# Patient Record
Sex: FEMALE | Race: WHITE | NOT HISPANIC OR LATINO | Employment: FULL TIME | URBAN - METROPOLITAN AREA
[De-identification: names, ages, dates, MRNs, and addresses within clinical notes are randomized per-mention and may not be internally consistent; named-entity substitution may affect disease eponyms.]

---

## 2019-12-01 ENCOUNTER — APPOINTMENT (OUTPATIENT)
Dept: CT IMAGING | Facility: CLINIC | Age: 59
End: 2019-12-01
Attending: EMERGENCY MEDICINE
Payer: COMMERCIAL

## 2019-12-01 ENCOUNTER — HOSPITAL ENCOUNTER (EMERGENCY)
Facility: CLINIC | Age: 59
Discharge: HOME | End: 2019-12-02
Attending: EMERGENCY MEDICINE | Admitting: EMERGENCY MEDICINE
Payer: COMMERCIAL

## 2019-12-01 DIAGNOSIS — T14.8XXA DRAINAGE FROM WOUND: ICD-10-CM

## 2019-12-01 DIAGNOSIS — T81.42XA INFECTION OF DEEP INCISIONAL SURGICAL SITE AFTER PROCEDURE, INITIAL ENCOUNTER: Primary | ICD-10-CM

## 2019-12-01 LAB
ANION GAP SERPL CALCULATED.3IONS-SCNC: 6 MMOL/L (ref 3–14)
BASOPHILS # BLD AUTO: 0.1 10E9/L (ref 0–0.2)
BASOPHILS NFR BLD AUTO: 0.5 %
BUN SERPL-MCNC: 13 MG/DL (ref 7–30)
CALCIUM SERPL-MCNC: 9.4 MG/DL (ref 8.5–10.1)
CHLORIDE SERPL-SCNC: 101 MMOL/L (ref 94–109)
CO2 BLDCOV-SCNC: 27 MMOL/L (ref 21–28)
CO2 SERPL-SCNC: 25 MMOL/L (ref 20–32)
CREAT SERPL-MCNC: 0.65 MG/DL (ref 0.52–1.04)
DIFFERENTIAL METHOD BLD: ABNORMAL
EOSINOPHIL # BLD AUTO: 0.1 10E9/L (ref 0–0.7)
EOSINOPHIL NFR BLD AUTO: 1.1 %
ERYTHROCYTE [DISTWIDTH] IN BLOOD BY AUTOMATED COUNT: 12.1 % (ref 10–15)
GFR SERPL CREATININE-BSD FRML MDRD: >90 ML/MIN/{1.73_M2}
GLUCOSE SERPL-MCNC: 123 MG/DL (ref 70–99)
HCT VFR BLD AUTO: 33.7 % (ref 35–47)
HGB BLD-MCNC: 11.2 G/DL (ref 11.7–15.7)
IMM GRANULOCYTES # BLD: 0.1 10E9/L (ref 0–0.4)
IMM GRANULOCYTES NFR BLD: 0.4 %
LACTATE BLD-SCNC: 0.7 MMOL/L (ref 0.7–2.1)
LYMPHOCYTES # BLD AUTO: 2.4 10E9/L (ref 0.8–5.3)
LYMPHOCYTES NFR BLD AUTO: 18.4 %
MCH RBC QN AUTO: 31.1 PG (ref 26.5–33)
MCHC RBC AUTO-ENTMCNC: 33.2 G/DL (ref 31.5–36.5)
MCV RBC AUTO: 94 FL (ref 78–100)
MONOCYTES # BLD AUTO: 1.1 10E9/L (ref 0–1.3)
MONOCYTES NFR BLD AUTO: 8.5 %
NEUTROPHILS # BLD AUTO: 9.2 10E9/L (ref 1.6–8.3)
NEUTROPHILS NFR BLD AUTO: 71.1 %
NRBC # BLD AUTO: 0 10*3/UL
NRBC BLD AUTO-RTO: 0 /100
PCO2 BLDV: 44 MM HG (ref 40–50)
PH BLDV: 7.4 PH (ref 7.32–7.43)
PLATELET # BLD AUTO: 487 10E9/L (ref 150–450)
PO2 BLDV: 37 MM HG (ref 25–47)
POTASSIUM SERPL-SCNC: 3.9 MMOL/L (ref 3.4–5.3)
RBC # BLD AUTO: 3.6 10E12/L (ref 3.8–5.2)
SAO2 % BLDV FROM PO2: 71 %
SODIUM SERPL-SCNC: 132 MMOL/L (ref 133–144)
WBC # BLD AUTO: 12.9 10E9/L (ref 4–11)

## 2019-12-01 PROCEDURE — 82803 BLOOD GASES ANY COMBINATION: CPT

## 2019-12-01 PROCEDURE — 36415 COLL VENOUS BLD VENIPUNCTURE: CPT | Performed by: EMERGENCY MEDICINE

## 2019-12-01 PROCEDURE — 74177 CT ABD & PELVIS W/CONTRAST: CPT

## 2019-12-01 PROCEDURE — 85025 COMPLETE CBC W/AUTO DIFF WBC: CPT | Performed by: EMERGENCY MEDICINE

## 2019-12-01 PROCEDURE — 25000128 H RX IP 250 OP 636: Performed by: EMERGENCY MEDICINE

## 2019-12-01 PROCEDURE — 83605 ASSAY OF LACTIC ACID: CPT

## 2019-12-01 PROCEDURE — 99285 EMERGENCY DEPT VISIT HI MDM: CPT | Mod: 25

## 2019-12-01 PROCEDURE — 87040 BLOOD CULTURE FOR BACTERIA: CPT | Performed by: EMERGENCY MEDICINE

## 2019-12-01 PROCEDURE — 25000125 ZZHC RX 250: Performed by: EMERGENCY MEDICINE

## 2019-12-01 PROCEDURE — 80048 BASIC METABOLIC PNL TOTAL CA: CPT | Performed by: EMERGENCY MEDICINE

## 2019-12-01 RX ORDER — HYDROCODONE BITARTRATE AND ACETAMINOPHEN 5; 325 MG/1; MG/1
1 TABLET ORAL EVERY 6 HOURS PRN
Qty: 10 TABLET | Refills: 0 | Status: SHIPPED | OUTPATIENT
Start: 2019-12-01 | End: 2019-12-04

## 2019-12-01 RX ORDER — CIPROFLOXACIN 500 MG/1
500 TABLET, FILM COATED ORAL 2 TIMES DAILY
Qty: 20 TABLET | Refills: 0 | Status: SHIPPED | OUTPATIENT
Start: 2019-12-01 | End: 2019-12-11

## 2019-12-01 RX ORDER — SULFAMETHOXAZOLE/TRIMETHOPRIM 800-160 MG
2 TABLET ORAL 2 TIMES DAILY
Qty: 40 TABLET | Refills: 0 | Status: SHIPPED | OUTPATIENT
Start: 2019-12-01 | End: 2019-12-11

## 2019-12-01 RX ORDER — IOPAMIDOL 755 MG/ML
500 INJECTION, SOLUTION INTRAVASCULAR ONCE
Status: COMPLETED | OUTPATIENT
Start: 2019-12-01 | End: 2019-12-01

## 2019-12-01 RX ADMIN — IOPAMIDOL 65 ML: 755 INJECTION, SOLUTION INTRAVENOUS at 23:05

## 2019-12-01 RX ADMIN — SODIUM CHLORIDE 56 ML: 9 INJECTION, SOLUTION INTRAVENOUS at 23:05

## 2019-12-01 ASSESSMENT — ENCOUNTER SYMPTOMS
ABDOMINAL PAIN: 1
FEVER: 0

## 2019-12-01 NOTE — ED AVS SNAPSHOT
[facility] Emergency Department  [address]  [facility] [address]  Phone:  [phone]  Fax:  [phone]                                    Diana Montiel   MRN: [id number]    Department:  [facility] Emergency Department   Date of Visit:  12/1/2019           After Visit Summary Signature Page    I have received my discharge instructions, and my questions have been answered. I have discussed any challenges I see with this plan with the nurse or doctor.    ..........................................................................................................................................  Patient/Patient Representative Signature      ..........................................................................................................................................  Patient Representative Print Name and Relationship to Patient    ..................................................               ................................................  Date                                   Time    ..........................................................................................................................................  Reviewed by Signature/Title    ...................................................              ..............................................  Date                                               Time          22EPIC Rev 08/18

## 2019-12-02 VITALS
WEIGHT: 130 LBS | SYSTOLIC BLOOD PRESSURE: 116 MMHG | TEMPERATURE: 98.3 F | HEART RATE: 80 BPM | DIASTOLIC BLOOD PRESSURE: 58 MMHG | OXYGEN SATURATION: 97 % | RESPIRATION RATE: 14 BRPM

## 2019-12-02 NOTE — ED TRIAGE NOTES
Pt states recent plastic surgery on abdomen, states today MICHAEL drain is painful and having green/dark drainage. ABCs intact GCS 15

## 2019-12-02 NOTE — DISCHARGE INSTRUCTIONS

## 2019-12-02 NOTE — ED NOTES
A/O. VSS. PIV removed. Pt verbalized understanding of d/c instructions and ambulated to lobby steady gait.   Script rx bactrim, cipro, and norco given to pt at time of d/c

## 2019-12-02 NOTE — ED PROVIDER NOTES
History     Chief Complaint:  Post-op Problem    HPI   Diana Montiel is a 59 year old female who presents to the emergency department with her daughter for evaluation of a post-op problem. The daughter reports that the patient underwent a bilateral breast reduction and tummy tuck surgery performed by Dr. Asuncion Chavez at [facility] on 11/12. Over the last three days the patient's MICHAEL drain has become painful and the drainage has turned from a dark red to a dark green color. The patient reports taking hydrocodone before leaving for the ED. She denies fever.    Allergies:  Oxycodone  Tetracycline    Medications:    The patient is not currently taking any prescribed medications.    Past Medical History:    The patient does not have any past pertinent medical history.    Past Surgical History:    The patient does not have any past pertinent medical history.    Family History:    History reviewed. No pertinent family history.     Social History:  The patient presents to the emergency department with her daughter  Marital Status:     Review of Systems   Constitutional: Negative for fever.   Gastrointestinal: Positive for abdominal pain.   All other systems reviewed and are negative.    Physical Exam     Patient Vitals for the past 24 hrs:   BP Temp Temp src Pulse Heart Rate Resp SpO2 Weight   12/02/19 0030 -- -- -- -- -- 14 -- --   12/02/19 0000 116/58 -- -- 80 -- -- 97 % --   12/01/19 2320 -- -- -- -- -- -- 96 % --   12/01/19 2315 123/69 -- -- 88 -- -- -- --   12/01/19 2300 127/49 -- -- 89 -- -- -- --   12/01/19 2245 120/55 -- -- 84 -- -- 97 % --   12/01/19 2240 -- -- -- -- -- -- 96 % --   12/01/19 2235 122/71 -- -- 82 -- -- -- --   12/01/19 2209 135/69 98.3  F (36.8  C) Temporal -- 95 15 97 % 59 kg (130 lb)     Physical Exam  General: Alert, appears well-developed and well-nourished. Cooperative.     In mild distress  HEENT:  Head:  Atraumatic  Ears:  External ears are normal  Mouth/Throat:  Oropharynx is  without erythema or exudate and mucous membranes are moist.   Eyes:   Conjunctivae normal and EOM are normal. No scleral icterus.  CV:  Normal rate, regular rhythm, normal heart sounds and radial pulses are 2+ and symmetric.  No murmur.  Resp:  Breath sounds are clear bilaterally    Non-labored, no retractions or accessory muscle use  GI:  Abdomen is soft, no distension.  Abdominoplasty incision to lower abdomen and pubis.  Single drain present to right pubis (draining left aspect of incision).  There is faint erythema and purulence to drain at right pubis.  There is some swelling and erythema to the left abdominal wall and flank with tenderness.  No crepitus.  There is central incision purulence and drainage.  No rebound or guarding.  No CVA tenderness bilaterally  MS:  Normal range of motion. No edema.    Normal strength in all 4 extremities.     Back atraumatic.    No midline cervical, thoracic, or lumbar tenderness  Skin:  Warm and dry.  Large incision wound to abdomen from recent abdominoplasty with wound drainage described above in abdominal exam section.   Neuro:  Alert. Normal strength.  GCS: 15  Psych:  Normal mood and affect.    Emergency Department Course   Imaging:  Radiographic findings were communicated with the patient who voiced understanding of the findings.  CT Abdomen pelvis with contrast:   IMPRESSION:   1.  Anterior abdominal wall surgical drain as above but no evidence for an abscess associated with this postsurgical change in the anterior abdominal wall. as per radiology.    Laboratory:  CBC: WBC: 12.9 (H), HGB: 11.2 (L), PLT: 487 (H)  BMP: Glucose 123 (H), Sodium 132 (L), o/w WNL (Creatinine: 0.65)  ISTAT VBG: pH 7.40 / PCO2 44 / PO2 37 / Bicarb 27 / O2 sat 71 / lactic acid 0.7      Emergency Department Course:  Nursing notes and vitals reviewed. (8620) I performed an exam of the patient as documented above.     Blood drawn. This was sent to the lab for further testing, results above.     The  patient was sent for a abdomen/pelvis CT scan while in the emergency department, findings above.     2308 I consulted with Dr. Asuncion Chavez of [facility] and discussed the patient.    2330 I rechecked the patient and discussed the results of her workup thus far.     Findings and plan explained to the Patient. Patient discharged home with instructions regarding supportive care, medications, and reasons to return. The importance of close follow-up was reviewed. The patient was prescribed cipro, norco, and bactrim DS.    I personally reviewed the laboratory results with the Patient and answered all related questions prior to discharge.   Impression & Plan      Medical Decision Making:  Diana Montiel is a 59-year-old female status post bilateral breast augmentation and abdominoplasty/tummy tuck who presents with increasing wound drainage to the left lower abdomen as well as discoloration of this drainage.  Patient with no fever or systemic symptoms and normal vital signs here.  On exam patient has significant mild erythema to the anterior aspect of her abdominal wall which seems to expand towards the left flank.  There is some mild associated swelling and significant green drainage and discharge within the MICHAEL drain.  Patient has some mild erythema and purulence around the entrance of that drainage wound to the right pubis.  I do have concern for potential abdominal wall cellulitis versus soft tissue abscess versus necrotizing fasciitis versus fat necrosis.  Patient's blood work is relatively reassuring with no significant lactate elevation, much lower concern for severe sepsis or septic shock.  She has a very mild leukocytosis of 12.9.  In the setting of no active fever, no indication for IV antibiotics at this time.  Upon presentation, patient did have blood cultures obtained and patient understands that these are in process at time of disposition.  We did obtain CT imaging out of concern for sinister infectious  pathologies.  The CT does show anterior abdominal wall drainage but no evidence for an abscess associated with this postsurgical change in the anterior abdominal wall.  I did discuss the patient's presentation with Dr. Chavez and she was reassured by the finding patient has no fever or septic symptoms this evening.  She would encourage switching the patient from her Keflex antibiotic to slightly broader spectrum antibiotics with both Bactrim and ciprofloxacin.  She does still want a see the patient for close wound recheck tomorrow in clinic.  Patient already has an appointment scheduled with her plastic surgeon.  She will certainly return to the emergency department if she develops fever or any new worsening symptoms.  Reassured by CT findings here this evening and otherwise reassuring blood work findings patient was discharged home.  She understood all return precautions and had all questions answered prior to discharge.  Discharged home in care of daughter.    Diagnosis:    ICD-10-CM    1. Infection of deep incisional surgical site after procedure, initial encounter T81.42XA    2. Drainage from wound T14.8XXA        Disposition:  discharged to home    Discharge Medications:  Discharge Medication List as of 12/2/2019 12:19 AM      START taking these medications    Details   ciprofloxacin (CIPRO) 500 MG tablet Take 1 tablet (500 mg) by mouth 2 times daily for 10 days, Disp-20 tablet, R-0, Local Print      HYDROcodone-acetaminophen (NORCO) 5-325 MG tablet Take 1 tablet by mouth every 6 hours as needed for severe pain, Disp-10 tablet, R-0, Local Print      sulfamethoxazole-trimethoprim (BACTRIM DS) 800-160 MG tablet Take 2 tablets by mouth 2 times daily for 10 days, Disp-40 tablet, R-0, Local PrintIndication: Possible MRSA infection             Aleksander Jones  12/1/2019   [facility] EMERGENCY DEPARTMENT  Scribe Disclosure:  I, Aleksander Jones, am serving as a scribe at 10:30 PM on 12/1/2019 to document services  personally performed by Royer Perkins MD based on my observations and the provider's statements to me.        Royer Perkins MD  12/02/19 0141

## 2019-12-08 LAB
BACTERIA SPEC CULT: NO GROWTH
BACTERIA SPEC CULT: NO GROWTH
Lab: NORMAL
SPECIMEN SOURCE: NORMAL
SPECIMEN SOURCE: NORMAL

## 2022-09-01 ENCOUNTER — OFFICE VISIT (OUTPATIENT)
Dept: PODIATRY | Facility: CLINIC | Age: 62
End: 2022-09-01
Payer: COMMERCIAL

## 2022-09-01 VITALS — SYSTOLIC BLOOD PRESSURE: 132 MMHG | HEIGHT: 63 IN | BODY MASS INDEX: 23.03 KG/M2 | DIASTOLIC BLOOD PRESSURE: 78 MMHG

## 2022-09-01 DIAGNOSIS — M20.5X9 MALLET TOE, UNSPECIFIED LATERALITY: ICD-10-CM

## 2022-09-01 DIAGNOSIS — M79.674 TOE PAIN, BILATERAL: Primary | ICD-10-CM

## 2022-09-01 DIAGNOSIS — M79.675 TOE PAIN, BILATERAL: Primary | ICD-10-CM

## 2022-09-01 DIAGNOSIS — M67.40 MUCOID CYST OF JOINT: ICD-10-CM

## 2022-09-01 PROCEDURE — 99204 OFFICE O/P NEW MOD 45 MIN: CPT | Performed by: PODIATRIST

## 2022-09-01 RX ORDER — VENLAFAXINE 37.5 MG/1
37.5 TABLET ORAL AT BEDTIME
COMMUNITY
Start: 2022-06-30 | End: 2023-01-23

## 2022-09-01 RX ORDER — OMEGA-3 FATTY ACIDS/FISH OIL 300-1000MG
1280 CAPSULE ORAL
COMMUNITY

## 2022-09-01 RX ORDER — MULTIPLE VITAMINS W/ MINERALS TAB 9MG-400MCG
1 TAB ORAL DAILY
COMMUNITY

## 2022-09-01 NOTE — PROGRESS NOTES
ASSESSMENT:  Encounter Diagnoses   Name Primary?     Toe pain, bilateral Yes     Mucoid cyst of joint      Mallet toe, unspecified laterality      MEDICAL DECISION MAKING:  The cystic lesions of the third toes are consistent with mucoid cyst.  This, along with the mallet toe deformity is likely contributing to pain.  She also has pain involving the fourth toes.  These toes have a contraction deformity.  I do not appreciate any cystic changes.    We reviewed the cause and nature of mucoid cyst.  We discussed living with the cysts as they are.  Due to pain, she does not want to do this any longer.  We reviewed aspiration, which is typically not a long-term solution.    She inquired about surgical options.  I explained that cyst excision with arthroplasty or arthrodesis of the joint is the standard.  Although I do not appreciate a cyst on the fourth toes, arthroplasty may or arthrodesis would likely relieve joint related pain.    We discussed the procedures as well as the typical postoperative course.  She made the decision that she would like to proceed with surgery.    She is provided information for scheduling surgery.  She will consider if she we will proceed with bilateral foot surgery, or the more painful left foot first.    Case request is placed.    I will want to see Diana back for a clinic or phone visit to have a more detailed discussion reasonable risk for surgery as well as the postoperative course and recommendations.    Disclaimer: This note consists of symbols derived from keyboarding, dictation and/or voice recognition software. As a result, there may be errors in the script that have gone undetected. Please consider this when interpreting information found in this chart.    Rigoberto Sykes, ANASTASIA, FACFAS, MS    [facility] Department of Podiatry/Foot & Ankle Surgery      ____________________________________________________________________    HPI:       Magali presents reporting ganglion cysts on both feet,  "notably her toes.  She has associated pain, left foot greater than right.  She describes an aching and throbbing.  She has dealt with this problem for years and previously saw a podiatrist in [place].  She reports a history of having a cyst removed as well as aspiration.  She is looking for a more definitive solution.  The discomfort is affecting her quality of life.    The problem specifically involves the bilateral third and fourth toes.    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.*  *No past surgical history on file.*  *  Current Outpatient Medications   Medication Sig Dispense Refill     cholecalciferol (VITAMIN D3) 25 mcg (1000 units) capsule Take 1 capsule by mouth daily       multivitamin w/minerals (MULTI-VITAMIN) tablet Take 1 tablet by mouth daily       omega 3 1000 MG CAPS Take 1,280 mg by mouth       Probiotic Product (MISC INTESTINAL ROBERTA REGULAT) CAPS Take 1 capsule by mouth       venlafaxine (EFFEXOR) 37.5 MG tablet Take 37.5 mg by mouth At Bedtime           EXAM:    Vitals: /78   Ht 1.6 m (5' 3\")   BMI 23.03 kg/m    BMI: Body mass index is 23.03 kg/m .    Constitutional:  Diana Montiel is in no apparent distress, appears well-nourished.  Cooperative with history and physical exam.    Vascular:  Pedal pulses are palpable for both the DP and PT arteries.  CFT < 3 sec.  No edema.      Neuro: Light touch sensation is intact to the L4, L5, S1 distributions  No evidence of weakness, spasticity, or contracture in the lower extremities.     Derm: Normal texture and turgor.  No erythema, ecchymosis, or cyanosis.  No open lesions.     Cystic lesions at the level of the distal interphalangeal joint, bilateral third toe.  No associated erythema.  There is some mild localized edema.    Pain on palpation over the distal interphalangeal joint of the bilateral fourth toe.  No cysts seen on clinical evaluation.    Musculoskeletal:    Lower extremity muscle strength " is normal. No gross deformities.  There is some flexion contracture at the distal interphalangeal joints of the bilateral third and fourth toes.

## 2022-09-01 NOTE — PATIENT INSTRUCTIONS
Thank you for choosing [facility] Podiatry / Foot & Ankle Surgery!    DR. FOLEY'S CLINIC LOCATIONS:     [facility] TRIAGE LINE: [phone]   [address] APPOINTMENTS: [phone]   [place] [address] RADIOLOGY: [phone]    SET UP SURGERY: [phone]    BILLING QUESTIONS: [phone]   [facility] FAX: [phone] [id number]    [place] [address]      Follow up: as needed      GANGLION CYST  A ganglion cyst is a sac filled with a jellylike fluid that originates from a tendon sheath or joint capsule. The word  ganglion  means  knot  and is used to describe the knot-like mass or lump that forms below the surface of the skin.  Ganglion cysts are among the most common benign soft-tissue masses. Although they most often occur on the wrist, they also frequently develop on the foot - usually on the top, but elsewhere as well. Ganglion cysts vary in size, may get smaller and larger, and may even disappear completely, only to return later.  CAUSES  Although the exact cause of ganglion cysts is unknown, they may arise from trauma - whether a single event or repetitive micro-trauma.  SYMPTOMS  A noticeable lump - often this is the only symptom experienced   Tingling or burning, if the cyst is touching a nerve   Dull pain or ache - which may indicate the cyst is pressing against a tendon or joint   Difficulty wearing shoes due to irritation between the lump and the shoe   DIAGNOSIS  To diagnose a ganglion cyst, the foot and ankle surgeon will perform a thorough examination of the foot. The lump will be visually apparent and, when pressed in a certain way, it should move freely underneath the skin. Sometimes the surgeon will shine a light through the cyst or remove a small amount of fluid from the cyst for evaluation. Your doctor may take an x-ray, and in some cases additional imaging studies may be ordered.  NON-SURGICAL TREATMENT  Monitoring, but no treatment. If the  cyst causes no pain and does not interfere with walking, the surgeon may decide it is best to carefully watch the cyst over a period of time.   Shoe modifications. Wearing shoes that do not rub the cyst or cause irritation may be advised. In addition, placing a pad inside the shoe may help reduce pressure against the cyst.   Aspiration and injection. This technique involves draining the fluid and then injecting a steroid medication into the mass. More than one session may be needed. Although this approach is successful in some cases, in many others the cyst returns.   SURGICAL TREATMENT  When other treatment options fail or are not appropriate, the cyst may need to be surgically removed. While the recurrence rate associated with surgery is much lower than that experienced with aspiration and injection therapy, there are nevertheless cases in which the ganglion cyst returns.

## 2022-09-01 NOTE — LETTER
9/1/2022         RE: Diana Montiel  [address] [facility] [address]        Dear Colleague,    Thank you for referring your patient, Diana Montiel, to the [facility] PODI[facility]RY. Please see a copy of my visit note below.    ASSESSMENT:  Encounter Diagnoses   Name Primary?     Toe pain, bilateral Yes     Mucoid cyst of joint      Mallet toe, unspecified laterality      MEDICAL DECISION MAKING:  The cystic lesions of the third toes are consistent with mucoid cyst.  This, along with the mallet toe deformity is likely contributing to pain.  She also has pain involving the fourth toes.  These toes have a contraction deformity.  I do not appreciate any cystic changes.    We reviewed the cause and nature of mucoid cyst.  We discussed living with the cysts as they are.  Due to pain, she does not want to do this any longer.  We reviewed aspiration, which is typically not a long-term solution.    She inquired about surgical options.  I explained that cyst excision with arthroplasty or arthrodesis of the joint is the standard.  Although I do not appreciate a cyst on the fourth toes, arthroplasty may or arthrodesis would likely relieve joint related pain.    We discussed the procedures as well as the typical postoperative course.  She made the decision that she would like to proceed with surgery.    She is provided information for scheduling surgery.  She will consider if she we will proceed with bilateral foot surgery, or the more painful left foot first.    Case request is placed.    I will want to see Diana back for a clinic or phone visit to have a more detailed discussion reasonable risk for surgery as well as the postoperative course and recommendations.    Disclaimer: This note consists of symbols derived from keyboarding, dictation and/or voice recognition software. As a result, there may be errors in the script that have gone undetected. Please consider this when interpreting  "information found in this chart.    Rigoberto Sykes, ANASTASIA, FACFAS, MS    [place] Department of Podiatry/Foot & Ankle Surgery      ____________________________________________________________________    HPI:       Magali presents reporting ganglion cysts on both feet, notably her toes.  She has associated pain, left foot greater than right.  She describes an aching and throbbing.  She has dealt with this problem for years and previously saw a podiatrist in [place].  She reports a history of having a cyst removed as well as aspiration.  She is looking for a more definitive solution.  The discomfort is affecting her quality of life.    The problem specifically involves the bilateral third and fourth toes.    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.*  *No past surgical history on file.*  *  Current Outpatient Medications   Medication Sig Dispense Refill     cholecalciferol (VITAMIN D3) 25 mcg (1000 units) capsule Take 1 capsule by mouth daily       multivitamin w/minerals (MULTI-VITAMIN) tablet Take 1 tablet by mouth daily       omega 3 1000 MG CAPS Take 1,280 mg by mouth       Probiotic Product (MISC INTESTINAL ROBERTA REGULAT) CAPS Take 1 capsule by mouth       venlafaxine (EFFEXOR) 37.5 MG tablet Take 37.5 mg by mouth At Bedtime           EXAM:    Vitals: /78   Ht 1.6 m (5' 3\")   BMI 23.03 kg/m    BMI: Body mass index is 23.03 kg/m .    Constitutional:  Diana Montiel is in no apparent distress, appears well-nourished.  Cooperative with history and physical exam.    Vascular:  Pedal pulses are palpable for both the DP and PT arteries.  CFT < 3 sec.  No edema.      Neuro: Light touch sensation is intact to the L4, L5, S1 distributions  No evidence of weakness, spasticity, or contracture in the lower extremities.     Derm: Normal texture and turgor.  No erythema, ecchymosis, or cyanosis.  No open lesions.     Cystic lesions at the level of the distal interphalangeal " joint, bilateral third toe.  No associated erythema.  There is some mild localized edema.    Pain on palpation over the distal interphalangeal joint of the bilateral fourth toe.  No cysts seen on clinical evaluation.    Musculoskeletal:    Lower extremity muscle strength is normal. No gross deformities.  There is some flexion contracture at the distal interphalangeal joints of the bilateral third and fourth toes.                Again, thank you for allowing me to participate in the care of your patient.        Sincerely,        Rigoberto Sykes DPM

## 2022-09-08 ENCOUNTER — TELEPHONE (OUTPATIENT)
Dept: PODIATRY | Facility: CLINIC | Age: 62
End: 2022-09-08

## 2022-09-08 NOTE — TELEPHONE ENCOUNTER
Attempt to reach patient to schedule surgery for left foot.  Left message for patient to call the surgery scheduling line at [phone].     Ritesh Jones, Surgery Scheduler

## 2022-09-09 NOTE — TELEPHONE ENCOUNTER
2nd attempt to reach patient to schedule surgery.      Left vmail, awaiting callback.       Ritesh Jones, Surgery Scheduler

## 2022-09-15 ENCOUNTER — HOSPITAL ENCOUNTER (OUTPATIENT)
Facility: CLINIC | Age: 62
End: 2022-09-15
Attending: PODIATRIST | Admitting: PODIATRIST
Payer: COMMERCIAL

## 2022-09-15 NOTE — TELEPHONE ENCOUNTER
Scheduled surgery    Type of surgery: left foot surgery: left 3rd toe cyst excision, possible right 3rd toe.  Left 3rd and 4th toe arthroplasty, possible right  Location of surgery: [facility]  Date and time of surgery: 10/18/22  Surgeon: Onel  Pre-Op Appt Date: 10/6/22  Post-Op Appt Date: 10/27/22   Packet sent out: Yes  Pre-cert/Authorization completed:  No  Date: 9/15/22      Ritesh Jones Surgery Scheduler

## 2022-10-06 ENCOUNTER — OFFICE VISIT (OUTPATIENT)
Dept: INTERNAL MEDICINE | Facility: CLINIC | Age: 62
End: 2022-10-06
Payer: COMMERCIAL

## 2022-10-06 ENCOUNTER — OFFICE VISIT (OUTPATIENT)
Dept: PODIATRY | Facility: CLINIC | Age: 62
End: 2022-10-06

## 2022-10-06 VITALS
DIASTOLIC BLOOD PRESSURE: 76 MMHG | HEART RATE: 78 BPM | BODY MASS INDEX: 23.91 KG/M2 | SYSTOLIC BLOOD PRESSURE: 128 MMHG | WEIGHT: 135 LBS

## 2022-10-06 VITALS
RESPIRATION RATE: 18 BRPM | OXYGEN SATURATION: 99 % | SYSTOLIC BLOOD PRESSURE: 128 MMHG | BODY MASS INDEX: 23.92 KG/M2 | HEART RATE: 78 BPM | DIASTOLIC BLOOD PRESSURE: 76 MMHG | TEMPERATURE: 98.1 F | HEIGHT: 63 IN | WEIGHT: 135 LBS

## 2022-10-06 DIAGNOSIS — M20.5X9 MALLET TOE, UNSPECIFIED LATERALITY: ICD-10-CM

## 2022-10-06 DIAGNOSIS — M67.40 MUCOID CYST OF JOINT: ICD-10-CM

## 2022-10-06 DIAGNOSIS — B00.1 RECURRENT COLD SORES: ICD-10-CM

## 2022-10-06 DIAGNOSIS — M79.674 TOE PAIN, BILATERAL: Primary | ICD-10-CM

## 2022-10-06 DIAGNOSIS — Z01.818 PREOPERATIVE EXAMINATION: Primary | ICD-10-CM

## 2022-10-06 DIAGNOSIS — M79.675 TOE PAIN, BILATERAL: Primary | ICD-10-CM

## 2022-10-06 PROBLEM — N62 MACROMASTIA: Status: ACTIVE | Noted: 2017-02-13

## 2022-10-06 LAB
BASOPHILS # BLD AUTO: 0 10E3/UL (ref 0–0.2)
BASOPHILS NFR BLD AUTO: 0 %
EOSINOPHIL # BLD AUTO: 0.1 10E3/UL (ref 0–0.7)
EOSINOPHIL NFR BLD AUTO: 1 %
ERYTHROCYTE [DISTWIDTH] IN BLOOD BY AUTOMATED COUNT: 12.9 % (ref 10–15)
HCT VFR BLD AUTO: 37.4 % (ref 35–47)
HGB BLD-MCNC: 12.5 G/DL (ref 11.7–15.7)
IMM GRANULOCYTES # BLD: 0 10E3/UL
IMM GRANULOCYTES NFR BLD: 0 %
LYMPHOCYTES # BLD AUTO: 2.9 10E3/UL (ref 0.8–5.3)
LYMPHOCYTES NFR BLD AUTO: 34 %
MCH RBC QN AUTO: 30.3 PG (ref 26.5–33)
MCHC RBC AUTO-ENTMCNC: 33.4 G/DL (ref 31.5–36.5)
MCV RBC AUTO: 91 FL (ref 78–100)
MONOCYTES # BLD AUTO: 0.4 10E3/UL (ref 0–1.3)
MONOCYTES NFR BLD AUTO: 5 %
NEUTROPHILS # BLD AUTO: 5.1 10E3/UL (ref 1.6–8.3)
NEUTROPHILS NFR BLD AUTO: 60 %
PLATELET # BLD AUTO: 384 10E3/UL (ref 150–450)
RBC # BLD AUTO: 4.12 10E6/UL (ref 3.8–5.2)
WBC # BLD AUTO: 8.6 10E3/UL (ref 4–11)

## 2022-10-06 PROCEDURE — 99213 OFFICE O/P EST LOW 20 MIN: CPT | Performed by: INTERNAL MEDICINE

## 2022-10-06 PROCEDURE — 36415 COLL VENOUS BLD VENIPUNCTURE: CPT | Performed by: INTERNAL MEDICINE

## 2022-10-06 PROCEDURE — 99214 OFFICE O/P EST MOD 30 MIN: CPT | Performed by: PODIATRIST

## 2022-10-06 PROCEDURE — 85025 COMPLETE CBC W/AUTO DIFF WBC: CPT | Performed by: INTERNAL MEDICINE

## 2022-10-06 PROCEDURE — 80048 BASIC METABOLIC PNL TOTAL CA: CPT | Performed by: INTERNAL MEDICINE

## 2022-10-06 RX ORDER — VALACYCLOVIR HYDROCHLORIDE 500 MG/1
TABLET, FILM COATED ORAL
Qty: 30 TABLET | Refills: 0 | Status: SHIPPED | OUTPATIENT
Start: 2022-10-06 | End: 2023-01-23

## 2022-10-06 RX ORDER — VALACYCLOVIR HYDROCHLORIDE 500 MG/1
TABLET, FILM COATED ORAL
COMMUNITY
Start: 2021-01-22 | End: 2022-10-06

## 2022-10-06 ASSESSMENT — ENCOUNTER SYMPTOMS
CARDIOVASCULAR NEGATIVE: 1
GASTROINTESTINAL NEGATIVE: 1
RESPIRATORY NEGATIVE: 1
MUSCULOSKELETAL NEGATIVE: 1
NEUROLOGICAL NEGATIVE: 1
CONSTITUTIONAL NEGATIVE: 1

## 2022-10-06 NOTE — PROGRESS NOTES
Patient is here for her preoperative visit.  She did see Dr. Chaudhry previously to seeing us today.

## 2022-10-06 NOTE — PATIENT INSTRUCTIONS
REVIEW OF SURGICAL RISKS  The following is a list of possible risks associated with foot and ankle surgery. This is not all-inclusive. Please realize that risks associated with elective foot surgery are low and there are ways to deal with them when they occur.     1) Infection:  Probably the most concerning and discussed risk of surgery, the chance of infection is about 1% with elective surgery. Often it involves the skin around the incision and resolves with oral antibiotics. It is rare that infection will be deep and require surgical intervention. You will receive an antibiotic prior to surgery.    2)  Pain: Surgery is trauma to the foot. Therefore a certain amount of pain is to be expected. This will be most bothersome during the first 1-2 days. Taking your pain medication, elevating the extremity above heart level, and using ice are all very important for adequate pain management.     3)  Swelling: This is due to the trauma of surgery. A certain degree of swelling is normal. However, if there is too much, it can impair healing, increase the risk of infection, add to your pain, and linger for several months after surgery making return to normal shoes difficult. Elevating the limb is extremely important.    4)  Healing Complications: There are many factors that can impair healing. There is no way to speed up the biological rate of healing. People tend to find ways to slow it down. Proper nutrition, not smoking, following the instructions provided by your surgeon are ways to help with normal healing.    Sometimes the skin is slow to heal, and an open area along the incision develops.  If this happens, it cannot be stitched closed. It then needs to heal from the inside out. Rarely there will be an issue with bone healing, which might require a special device called a bone stimulator and/or a revision surgery.    5)  Temporary and Permanent Numbness: Numbness beyond the area of surgery is to be expected. Initially it  "is from the local anesthetic that was injected into your foot. This wears off within 24 hours. Continued numbness or tingling is usually from swelling that compresses the nerves. Numbness that lasts for weeks is from nerve injury/irritation. This might eventually resolve or be permanent.      6)  Blood Clot:  Although rare, this is potentially the most dangerous risk associated with foot surgery. A blood clot in the leg can lead to varicose veins and chronic swelling. A blood clot that travels to the lungs is a very serious condition requiring hospitalization. Increased risk is related to trauma of surgery and degree of immobilization. There are many other risk factors that are not related directly to surgery (smoking, family history, personal history of varicose veins and/or blood clots, cancer, high fat cholesterol and lipids). Your surgeon will discuss this with you and devise an appropriate plan to help prevent this complication.    7)  Hypertrophic Scar: Some people have skin that is prone to forming exaggerated scar tissue. This can be unsightly and uncomfortable. There are ways to treat it.        8)  Complex Regional Pain Syndrome:  Rarely some people have pain that is out of proportion to the surgical procedure and harder to get control of. This pain can linger and cause changes in skin temperature and appearance. If this occurs, physical therapy and/or a pain specialist might be needed.      9) There are also intra-operative challenges and complications that can occur.   Intra-operative challenges can involve finding poor bone quality, difficulty with the internal fixation (when used), and even inadvertent injury to neighboring structures that would then need to be repaired.     Please remember that surgical complications are rare. Your surgeon has a plan to deal with them, should one occur. The primary goal of surgery is pain reduction. There are no guarantees. An \"abnormal\" foot prior to surgery, is not " "necessarily a \"normal\" foot afterwards. Hopefully it is a less painful one.      If you have any questions, please discuss with Dr. Sykes prior to surgery.        POST-OPERATIVE CARE RECOMENDATIONS    ACTIVITY:    1)  Weight bearing status: __________________________    2)  Keep your surgical lower extremity elevated 23/24 hours above heart level. You will want to keep your foot elevated as much as possible for the first week after surgery.     3)  It is recommended that you get up and move around (walk, crutch, roll) for short periods each hour while you are awake. It is okay to wiggle your toes. If you are not in a splint or cast, move your ankle joint. Motion helps lower risk of a blood clot in your leg.      4)  If you bathe or shower, the dressing must be kept dry. Safety is a concern and sponge bathing might be best. You can purchase a water proof cast protector.     5)  You are not to drive while you are taking pain medications. No driving, if surgery was done on the right foot/ ankle or if you drive a stick shift.     SPECIFIC CARES:    1)  Keep the dressing clean, dry, and intact. If your dressing gets wet, comes apart, or falls off, please call your clinic and notify Dr. Sykes. Some bleeding through the dressing is okay. But if it causes a spot bigger than 2 inches in diameter, please notify Dr. Sykes.     2)  Place an ice pack behind the knee of the surgical side for up to 20min/hour. It can also be placed on the front of the ankle.     4)  Call your clinic if you experience calf pain, chest pain, or problems breathing.    5)  Call your clinic if fever, increasing pain that you can't control or a large amount of bleeding.      6) What to do if your pain seems out of control:   1)  Make sure you are truly elevating the foot/ankle above heart level.   2)  Make sure you are using a cold pack/ ice   3)  Check the pain medication instructions:     Usually you can take two pain pills every four hours, if " needed.   4)  If the above are not adequate, then you need to remove the brace/ boot and   remove the compression wrap. The wrap might be too tight. After you do   this, elevate the foot for an hour and then re-wrap the foot lightly and   replace the splint / boot.      Follow up in clinic one week after surgery. This appointment should already be set up.      MEDICATIONS:    IMPORTANT:  Take your pain medication when you get home, even if you are not having pain. You want it in your system before the local anesthetic (foot numbness from the shot) wears off.      1)  Take your pain medication with food. This will help prevent any nausea side effects.  If hydroxyzine was prescribed, it is for itching, leg spasms, and makes the other pain medication work better.    2)  Anti blood clot plan: To help prevent a blood clot in your legs, it is important that you wiggle your toes and ankles frequently. Obviously, this might be limited on the surgical side. Get up and move around a few minutes every hour while you are awake.  Keep the white sock on the non-surgical side and the compression wraps to the knee on the surgical side. If Dr. Sykes thinks that you are at high risk for a blood clot, he might put you on a blood thinner called enoxaparin.    Please call the clinic if you have any pain or swelling in either calf.        SHOWERING BEFORE SURGERY  You must wash with the soap (Hibiclens - 4% CHG) twice before coming to the hospital for your surgery. This will decrease bacteria (germs) on your skin. It will also help reduce your chance of infection (illness caused by germs) after surgery.  Read the directions and safety tips on the bottle of soap. Wash once the evening before surgery and once the morning of surgery. Use 4 ounces of soap each time. When showering, it is best to use two fresh washcloths and a fresh towel.   Items you will need for showerin newly washed washcloths   2 newly washed towels   8 ounces of  one of the soap  FOLLOW THESE INSTRUCTIONS:  The evening before surgery   1. Shower or bathe as you normally would, use your regular soap and a clean washcloth. Give special attention to places where your incision (surgical cut) or catheters will be. This includes your groin area. Rinse well. You may wash your hair with your regular shampoo.  2. Next, wash your entire body from your chin down with the antiseptic soap. See the next page for directions about how to do this.  3. Rinse well and dry off using a newly washed towel.  The morning of surgery  Repeat steps 1, 2 and 3.   Other suggestions:   Wear freshly washed pajamas or clothing after your evening shower.   Wear freshly washed clothes the day of surgery.   Wash and change your bed sheets the day before surgery to have clean bed sheets after you shower and when you get home from surgery.   If you have trouble washing all areas, make sure someone helps you.   Don t use any deodorant, lotion or powder after your shower.   Women who are menstruating should wear a fresh sanitary pad to the hospital.       **If you have questions or concerns after surgery, please call: Podiatry/Foot & Ankle Surgery Triage Team at the [facility] at [phone]..

## 2022-10-06 NOTE — LETTER
10/6/2022         RE: Diana Montiel  [address] [address]        Dear Colleague,    Thank you for referring your patient, Diana Montiel, to the [facility]. Please see a copy of my visit note below.    Patient is here for her preoperative visit.  She did see Dr. Chaudhry previously to seeing us today.     ASSESSMENT:  Encounter Diagnoses   Name Primary?     Toe pain, bilateral Yes     Mucoid cyst of joint      Mallet toe, unspecified laterality      MEDICAL DECISION MAKING:  On clinical exam today and per her report, the pain is in the bilateral second and third toes, not third and fourth as previously reported.  After discussion, we will plan on proceeding with arthroplasty of all 4 toes.  Where there is a dorsal mucoid cyst, this will also be excised.    The surgical procedures were discussed in detail, including risks, benefits, post operative course and cares, and prognosis.  Risks discussed include, but are not limited to,  postoperative pain, swelling,  infection, healing complications, temporary or permanent numbness, development of a new toe defomrity, DVT, hypertropohic scar formation, complex regional pain syndrome and need for additional surgery.       I explained that although infrequent, there are intra-operative and post-operative challenges and complications that can occur.  Some of the latter are listed above.  Intra-operative challenges can involve finding poor bone quality, difficulty with the internal fixation (when used), and even inadvertent injury to neighboring structures that would then need to be repaired.     No guarantees were given.     For baseline pain control:  ibuprofen, Tylenol, ice, elevation, rest  For breakthrough pain control: oxycodone  She was accepting of this plan.    Diana Montiel was also informed that a  might be present on the day of surgery.     Total time spent on this patient's care, date  of service 10/6/2022, was 30 minutes.  This involved clinical exam, review of the surgical procedures, review of surgical risks, review of the postoperative recommendations and documentation of today's visit.    Disclaimer: This note consists of symbols derived from keyboarding, dictation and/or voice recognition software. As a result, there may be errors in the script that have gone undetected. Please consider this when interpreting information found in this chart.    Rigoberto Sykes, ANASTASIA, FACFAS, MS    [place] [facility] Pod[facility]/Foot & Ankle Surgery      ____________________________________________________________________    HPI:       Diana presents today for reevaluation and discussion regarding her upcoming bilateral foot surgery on 10/18/2022.  She is scheduled for cyst excision and arthroplasties of toes bilateral foot.  These problems cause her pain on a daily basis.  More pain with footwear and prolonged weightbearing activities.      *No past medical history on file.*  *No past surgical history on file.*  *  Current Outpatient Medications   Medication Sig Dispense Refill     cholecalciferol (VITAMIN D3) 25 mcg (1000 units) capsule Take 1 capsule by mouth daily       multivitamin w/minerals (THERA-VIT-M) tablet Take 1 tablet by mouth daily       omega 3 1000 MG CAPS Take 1,280 mg by mouth       Probiotic Product (MISC INTESTINAL ROBERTA REGULAT) CAPS Take 1 capsule by mouth       valACYclovir (VALTREX) 500 MG tablet TAKE 1 TABLET BY MOUTH AS DIRECTED AS NEEDED 30 tablet 0     venlafaxine (EFFEXOR) 37.5 MG tablet Take 37.5 mg by mouth At Bedtime           EXAM:    Vitals: /76   Pulse 78   Wt 61.2 kg (135 lb)   BMI 23.91 kg/m    BMI: Body mass index is 23.91 kg/m .       Vascular:  Pedal pulses are palpable for both the DP and PT arteries.  CFT < 3 sec.  No edema.       Neuro: Light touch sensation is intact to the L4, L5, S1 distributions  No evidence of weakness, spasticity, or contracture in the  lower extremities.      Derm: Normal texture and turgor.  No erythema, ecchymosis, or cyanosis.  No open lesions.      Cystic lesions at the level of the distal interphalangeal joint, bilateral 2nd toe.  No associated erythema.  There is some mild localized edema.     Pain on palpation over the distal interphalangeal joint of the bilateral fourth toe.  No cysts seen on clinical evaluation.     Musculoskeletal:    Lower extremity muscle strength is normal. No gross deformities.  There is some flexion contracture at the distal interphalangeal joints of the bilateral third and fourth toes.          Again, thank you for allowing me to participate in the care of your patient.        Sincerely,        Rigoberto Sykes DPM

## 2022-10-06 NOTE — PROGRESS NOTES
Addendum  CBC and BMP were unremarkable.  Patient should be able to proceed with her surgery as scheduled.    [facility]  [address] [facility] [address]  Phone: [phone]  Primary Provider: No Ref-Primary, Physician  Pre-op Performing Provider: WINSTON ZAMUDIO      PREOPERATIVE EVALUATION:  Today's date: 10/6/2022    Diana Montiel is a 62 year old female who presents for a preoperative evaluation.    Surgical Information:  Surgery/Procedure:   left third toe excision of mucoid cyst,  Possible right third toe excision of mucoid cyst Bilateral MAC with Local   Arthroplasty, distal interphalangeal joint, left third toe Possible arthroplasty, distal interphalangeal joint right third toe,Arthroplasty, distal interphalangeal joint, left fourth toe Possible arthroplasty, distal interphalangeal joint, right fourth toe         Surgery Location[facility]  Surgeon: Dr. Sykes  Surgery Date: 10/18/22  Time of Surgery: TBD  Where patient plans to recover: At home with family  Fax number for surgical facility: Note does not need to be faxed, will be available electronically in Epic.    Type of Anesthesia Anticipated: to be determined    Assessment & Plan     The proposed surgical procedure is considered INTERMEDIATE risk.    Preoperative examination and mucoid cyst of joints  At this time, patient does have an unremarkable physical examination.  Her blood pressure is noted to be acceptable.  She has previously tolerated anesthesia without complication.  Patient is also intolerant greater than 4 METS physical activity.  At this time, I would consider her to be an acceptable candidate to proceed with a noncardiac related surgery.  She does have a BMP and CBC that is currently pending.  Assuming no unexpected abnormalities on her outstanding lab work, patient should be able to proceed with her surgery as scheduled.  She should follow any additional instructions provided  to her by her performing surgeon.  - Basic metabolic panel  (Ca, Cl, CO2, Creat, Gluc, K, Na, BUN); Future  - CBC with platelets and differential; Future    Recurrent cold sores  Patient does keep a small supply of Valtrex on hand to be used on an as-needed basis for the development of any cold sores.  A refill of her valacyclovir prescription was submitted to her pharmacy.  Side effects of medication were reviewed.  Patient had no further questions or concerns in this regard.  - valACYclovir (VALTREX) 500 MG tablet; TAKE 1 TABLET BY MOUTH AS DIRECTED AS NEEDED         Risks and Recommendations:  The patient has the following additional risks and recommendations for perioperative complications:   - No identified additional risk factors other than previously addressed    Medication Instructions:  Patient is to take all scheduled medications on the day of surgery    RECOMMENDATION:  APPROVAL GIVEN to proceed with proposed procedure pending review of diagnostic evaluation.        30 minutes spent on the date of the encounter doing chart review, history and exam, documentation and further activities per the note        Subjective     HPI related to upcoming procedure:   Patient is a 62-year-old  female who presents to the clinic for a preoperative examination.  She is currently scheduled to undergo a mucoid cyst resection from multiple toes on October 18, 2022.  Patient has undergone numerous previous surgical procedures.  Patient has tolerated anesthesia without issue.  She denies any family history of anesthesia intolerance or bleeding disorders.  Patient does not have a heart murmur.  She does take Effexor for management of depression.  She will also utilize Valtrex on an as-needed basis for recurrent cold sores.  Patient is able to walk up a flight of stairs description chest pain, shortness of breath, or syncopal episodes.  She is a non-smoker.    Preop Questions 10/6/2022   1. Have you ever had a heart  attack or stroke? No   2. Have you ever had surgery on your heart or blood vessels, such as a stent placement, a coronary artery bypass, or surgery on an artery in your head, neck, heart, or legs? No   3. Do you have chest pain with activity? No   4. Do you have a history of  heart failure? No   5. Do you currently have a cold, bronchitis or symptoms of other infection? No   6. Do you have a cough, shortness of breath, or wheezing? No   7. Do you or anyone in your family have previous history of blood clots? YES -    8. Do you or does anyone in your family have a serious bleeding problem such as prolonged bleeding following surgeries or cuts? No   9. Have you ever had problems with anemia or been told to take iron pills? No   10. Have you had any abnormal blood loss such as black, tarry or bloody stools, or abnormal vaginal bleeding? No   11. Have you ever had a blood transfusion? YES -    11a. Have you ever had a transfusion reaction? No   12. Are you willing to have a blood transfusion if it is medically needed before, during, or after your surgery? Yes   13. Have you or any of your relatives ever had problems with anesthesia? No   14. Do you have sleep apnea, excessive snoring or daytime drowsiness? No   15. Do you have any artifical heart valves or other implanted medical devices like a pacemaker, defibrillator, or continuous glucose monitor? No   16. Do you have artificial joints? No   17. Are you allergic to latex? No       Health Care Directive:  Patient does not have a Health Care Directive or Living Will: Discussed advance care planning with patient; however, patient declined at this time.    Preoperative Review of :   reviewed - no record of controlled substances prescribed.          Review of Systems   Constitutional: Negative.    HENT: Negative.    Respiratory: Negative.    Cardiovascular: Negative.    Gastrointestinal: Negative.    Genitourinary: Negative.    Musculoskeletal: Negative.   "  Neurological: Negative.          There are no problems to display for this patient.     No past medical history on file.  No past surgical history on file.  Current Outpatient Medications   Medication Sig Dispense Refill     cholecalciferol (VITAMIN D3) 25 mcg (1000 units) capsule Take 1 capsule by mouth daily       multivitamin w/minerals (THERA-VIT-M) tablet Take 1 tablet by mouth daily       omega 3 1000 MG CAPS Take 1,280 mg by mouth       Probiotic Product (MISC INTESTINAL ROBERTA REGULAT) CAPS Take 1 capsule by mouth       venlafaxine (EFFEXOR) 37.5 MG tablet Take 37.5 mg by mouth At Bedtime         Allergies   Allergen Reactions     Oxycodone      Tetracycline         Social History     Tobacco Use     Smoking status: Never Smoker     Smokeless tobacco: Never Used   Substance Use Topics     Alcohol use: Not on file       History   Drug Use Not on file         Objective     Blood pressure 128/76, pulse 78, temperature 98.1  F (36.7  C), resp. rate 18, height 1.6 m (5' 3\"), weight 61.2 kg (135 lb), SpO2 99 %, not currently breastfeeding.        Physical Exam  Vitals reviewed.   Constitutional:       General: She is not in acute distress.     Appearance: She is well-developed.   HENT:      Right Ear: Tympanic membrane and external ear normal.      Left Ear: Tympanic membrane and external ear normal.      Nose: Nose normal.      Mouth/Throat:      Pharynx: No oropharyngeal exudate.   Eyes:      General:         Right eye: No discharge.         Left eye: No discharge.      Conjunctiva/sclera: Conjunctivae normal.      Pupils: Pupils are equal, round, and reactive to light.   Neck:      Thyroid: No thyromegaly.      Trachea: No tracheal deviation.   Cardiovascular:      Rate and Rhythm: Normal rate and regular rhythm.      Pulses: Normal pulses.      Heart sounds: Normal heart sounds, S1 normal and S2 normal. No murmur heard.    No friction rub. No S3 or S4 sounds.   Pulmonary:      Effort: Pulmonary effort is " normal. No respiratory distress.      Breath sounds: Normal breath sounds. No wheezing or rales.   Abdominal:      General: Bowel sounds are normal.      Palpations: Abdomen is soft. There is no mass.      Tenderness: There is no abdominal tenderness.   Musculoskeletal:         General: Deformity (Mucoid cyst noted on multiple toes) present. Normal range of motion.      Cervical back: Neck supple.   Lymphadenopathy:      Cervical: No cervical adenopathy.   Skin:     General: Skin is warm and dry.      Findings: No rash.   Neurological:      Mental Status: She is alert and oriented to person, place, and time.      Motor: No abnormal muscle tone.      Deep Tendon Reflexes: Reflexes are normal and symmetric.   Psychiatric:         Thought Content: Thought content normal.         Judgment: Judgment normal.             Diagnostics:  Labs pending at this time.  Results will be reviewed when available.   No EKG required for low risk surgery (cataract, skin procedure, breast biopsy, etc).    Revised Cardiac Risk Index (RCRI):  The patient has the following serious cardiovascular risks for perioperative complications:   - No serious cardiac risks = 0 points     RCRI Interpretation: 0 points: Class I (very low risk - 0.4% complication rate)           Signed Electronically by: Petros Chaudhry MD  Copy of this evaluation report is provided to requesting physician.

## 2022-10-07 ENCOUNTER — PREP FOR PROCEDURE (OUTPATIENT)
Dept: PODIATRY | Facility: CLINIC | Age: 62
End: 2022-10-07

## 2022-10-07 LAB
ANION GAP SERPL CALCULATED.3IONS-SCNC: 6 MMOL/L (ref 3–14)
BUN SERPL-MCNC: 19 MG/DL (ref 7–30)
CALCIUM SERPL-MCNC: 9.7 MG/DL (ref 8.5–10.1)
CHLORIDE BLD-SCNC: 110 MMOL/L (ref 94–109)
CO2 SERPL-SCNC: 25 MMOL/L (ref 20–32)
CREAT SERPL-MCNC: 0.62 MG/DL (ref 0.52–1.04)
GFR SERPL CREATININE-BSD FRML MDRD: >90 ML/MIN/1.73M2
GLUCOSE BLD-MCNC: 94 MG/DL (ref 70–99)
POTASSIUM BLD-SCNC: 4 MMOL/L (ref 3.4–5.3)
SODIUM SERPL-SCNC: 141 MMOL/L (ref 133–144)

## 2022-10-07 NOTE — PROGRESS NOTES
ASSESSMENT:  Encounter Diagnoses   Name Primary?     Toe pain, bilateral Yes     Mucoid cyst of joint      Mallet toe, unspecified laterality      MEDICAL DECISION MAKING:  On clinical exam today and per her report, the pain is in the bilateral second and third toes, not third and fourth as previously reported.  After discussion, we will plan on proceeding with arthroplasty of all 4 toes.  Where there is a dorsal mucoid cyst, this will also be excised.    The surgical procedures were discussed in detail, including risks, benefits, post operative course and cares, and prognosis.  Risks discussed include, but are not limited to,  postoperative pain, swelling,  infection, healing complications, temporary or permanent numbness, development of a new toe defomrity, DVT, hypertropohic scar formation, complex regional pain syndrome and need for additional surgery.       I explained that although infrequent, there are intra-operative and post-operative challenges and complications that can occur.  Some of the latter are listed above.  Intra-operative challenges can involve finding poor bone quality, difficulty with the internal fixation (when used), and even inadvertent injury to neighboring structures that would then need to be repaired.     No guarantees were given.     For baseline pain control:  ibuprofen, Tylenol, ice, elevation, rest  For breakthrough pain control: oxycodone  She was accepting of this plan.    Diana LEONARD Montiel was also informed that a  might be present on the day of surgery.     Total time spent on this patient's care, date of service 10/6/2022, was 30 minutes.  This involved clinical exam, review of the surgical procedures, review of surgical risks, review of the postoperative recommendations and documentation of today's visit.    Disclaimer: This note consists of symbols derived from keyboarding, dictation and/or voice recognition software. As a result, there may be errors in the  script that have gone undetected. Please consider this when interpreting information found in this chart.    Rigoberto Sykes, ANASTASIA, FACFAS, MS    [facility] Ankle [facility]      ____________________________________________________________________    HPI:       Diana presents today for reevaluation and discussion regarding her upcoming bilateral foot surgery on 10/18/2022.  She is scheduled for cyst excision and arthroplasties of toes bilateral foot.  These problems cause her pain on a daily basis.  More pain with footwear and prolonged weightbearing activities.      *No past medical history on file.*  *No past surgical history on file.*  *  Current Outpatient Medications   Medication Sig Dispense Refill     cholecalciferol (VITAMIN D3) 25 mcg (1000 units) capsule Take 1 capsule by mouth daily       multivitamin w/minerals (THERA-VIT-M) tablet Take 1 tablet by mouth daily       omega 3 1000 MG CAPS Take 1,280 mg by mouth       Probiotic Product (MISC INTESTINAL ROBERTA REGULAT) CAPS Take 1 capsule by mouth       valACYclovir (VALTREX) 500 MG tablet TAKE 1 TABLET BY MOUTH AS DIRECTED AS NEEDED 30 tablet 0     venlafaxine (EFFEXOR) 37.5 MG tablet Take 37.5 mg by mouth At Bedtime           EXAM:    Vitals: /76   Pulse 78   Wt 61.2 kg (135 lb)   BMI 23.91 kg/m    BMI: Body mass index is 23.91 kg/m .       Vascular:  Pedal pulses are palpable for both the DP and PT arteries.  CFT < 3 sec.  No edema.       Neuro: Light touch sensation is intact to the L4, L5, S1 distributions  No evidence of weakness, spasticity, or contracture in the lower extremities.      Derm: Normal texture and turgor.  No erythema, ecchymosis, or cyanosis.  No open lesions.      Cystic lesions at the level of the distal interphalangeal joint, bilateral 2nd toe.  No associated erythema.  There is some mild localized edema.     Pain on palpation over the distal interphalangeal joint of the bilateral fourth toe.  No cysts  seen on clinical evaluation.     Musculoskeletal:    Lower extremity muscle strength is normal. No gross deformities.  There is some flexion contracture at the distal interphalangeal joints of the bilateral third and fourth toes.

## 2022-10-11 ENCOUNTER — MYC MEDICAL ADVICE (OUTPATIENT)
Dept: PODIATRY | Facility: CLINIC | Age: 62
End: 2022-10-11

## 2022-10-11 NOTE — TELEPHONE ENCOUNTER
Please see Mychart request to correct forms.   Per Ritesh, the surgery orders have been changed to included bilateral 2nd and 3rd toes.   Please advise if patient is going to have all 4 cysts addressed.    YUMIKO Ibarra RN

## 2022-10-17 ENCOUNTER — TELEPHONE (OUTPATIENT)
Dept: PODIATRY | Facility: CLINIC | Age: 62
End: 2022-10-17

## 2022-10-17 DIAGNOSIS — M67.40 MUCOID CYST OF JOINT: Primary | ICD-10-CM

## 2022-10-17 DIAGNOSIS — M20.5X9 MALLET TOE, UNSPECIFIED LATERALITY: ICD-10-CM

## 2022-10-17 NOTE — TELEPHONE ENCOUNTER
Patient called to cancel 10/18 surgery.  Patient does not have a ride. Patient will call to reschedule when she is able to secure a ride.       Ritesh Jones, Surgery Scheduler

## 2023-01-22 DIAGNOSIS — B00.1 RECURRENT COLD SORES: ICD-10-CM

## 2023-01-23 RX ORDER — VENLAFAXINE 37.5 MG/1
TABLET ORAL
Qty: 90 TABLET | OUTPATIENT
Start: 2023-01-23

## 2023-01-23 RX ORDER — VALACYCLOVIR HYDROCHLORIDE 500 MG/1
TABLET, FILM COATED ORAL
Qty: 30 TABLET | Refills: 0 | OUTPATIENT
Start: 2023-01-23

## 2023-01-23 RX ORDER — VALACYCLOVIR HYDROCHLORIDE 500 MG/1
TABLET, FILM COATED ORAL
Qty: 30 TABLET | Refills: 0 | Status: SHIPPED | OUTPATIENT
Start: 2023-01-23 | End: 2023-05-01

## 2023-01-23 RX ORDER — VENLAFAXINE 37.5 MG/1
37.5 TABLET ORAL AT BEDTIME
Qty: 90 TABLET | Refills: 1 | Status: SHIPPED | OUTPATIENT
Start: 2023-01-23 | End: 2023-08-03

## 2023-01-23 NOTE — TELEPHONE ENCOUNTER
Routing refill request to provider for review/approval because:  Medication is reported/historical    Cj Zhu RN

## 2023-01-29 ENCOUNTER — HEALTH MAINTENANCE LETTER (OUTPATIENT)
Age: 63
End: 2023-01-29

## 2023-04-28 DIAGNOSIS — B00.1 RECURRENT COLD SORES: ICD-10-CM

## 2023-05-01 RX ORDER — VALACYCLOVIR HYDROCHLORIDE 500 MG/1
TABLET, FILM COATED ORAL
Qty: 30 TABLET | Refills: 1 | Status: SHIPPED | OUTPATIENT
Start: 2023-05-01 | End: 2023-12-21

## 2023-05-02 NOTE — TELEPHONE ENCOUNTER
Pending Prescriptions:                       Disp   Refills    valACYclovir (VALTREX) 500 MG tablet [Pha*30 tab*1            Sig: TAKE 1 TABLET BY MOUTH AS DIRECTED AS NEEDED    Prescription approved per [facility] Refill Protocol.

## 2023-07-30 ENCOUNTER — HEALTH MAINTENANCE LETTER (OUTPATIENT)
Age: 63
End: 2023-07-30

## 2023-08-03 DIAGNOSIS — F32.A DEPRESSION, UNSPECIFIED DEPRESSION TYPE: Primary | ICD-10-CM

## 2023-08-03 DIAGNOSIS — B00.1 RECURRENT COLD SORES: ICD-10-CM

## 2023-08-03 RX ORDER — VENLAFAXINE 37.5 MG/1
TABLET ORAL
Qty: 90 TABLET | Refills: 0 | Status: SHIPPED | OUTPATIENT
Start: 2023-08-03 | End: 2023-11-08

## 2023-08-03 NOTE — TELEPHONE ENCOUNTER
"Pending Prescriptions:                       Disp   Refills    venlafaxine (EFFEXOR) 37.5 MG tablet [Pha*90 tab*1            Sig: TAKE 1 TABLET(37.5 MG) BY MOUTH AT BEDTIME    Per chart, diagnosis for medication is recurrent cold sores.  Per last office visit dictation 10/6/22:    \"She does take Effexor for management of depression.\"     PHQ-9 score:         No data to display                Routing refill request to provider for review/approval because:  Most recent phq9 is outside protocol parameters.  Please correct the diagnosis for medication.    Please advise, thanks.          "

## 2023-08-07 DIAGNOSIS — F32.A DEPRESSION, UNSPECIFIED DEPRESSION TYPE: ICD-10-CM

## 2023-08-08 RX ORDER — VENLAFAXINE 37.5 MG/1
TABLET ORAL
Qty: 90 TABLET | Refills: 0 | OUTPATIENT
Start: 2023-08-08

## 2023-11-06 ENCOUNTER — TELEPHONE (OUTPATIENT)
Dept: INTERNAL MEDICINE | Facility: CLINIC | Age: 63
End: 2023-11-06
Payer: COMMERCIAL

## 2023-11-06 DIAGNOSIS — F32.A DEPRESSION, UNSPECIFIED DEPRESSION TYPE: ICD-10-CM

## 2023-11-06 NOTE — TELEPHONE ENCOUNTER
Call to patient. Advised appointment needed. Transferred to scheduling. Can refill be sent until appointment.     Next 5 appointments (look out 90 days)      Dec 21, 2023  1:00 PM  (Arrive by 12:40 PM)  Provider Visit with Petros Chaudhry MD  [facility] ([facility] ) [address]  [address]  [facility] [id number] [phone]

## 2023-11-06 NOTE — TELEPHONE ENCOUNTER
Patient calls for status of this refill. Does she need an appointment? VV or in person?  If so, can she get a 30 day supply?      Pls call patient on phone [phone]

## 2023-11-08 RX ORDER — VENLAFAXINE 37.5 MG/1
TABLET ORAL
Qty: 30 TABLET | Refills: 1 | Status: SHIPPED | OUTPATIENT
Start: 2023-11-08 | End: 2023-12-21

## 2023-11-08 NOTE — TELEPHONE ENCOUNTER
Patient informed prescription sent to pharmacy for 30 day supply with 1 refills, this should last until her upcoming appointment.     Tierra Ward RN  [facility]

## 2023-12-21 ENCOUNTER — OFFICE VISIT (OUTPATIENT)
Dept: INTERNAL MEDICINE | Facility: CLINIC | Age: 63
End: 2023-12-21
Payer: COMMERCIAL

## 2023-12-21 VITALS
DIASTOLIC BLOOD PRESSURE: 79 MMHG | WEIGHT: 139 LBS | HEART RATE: 81 BPM | SYSTOLIC BLOOD PRESSURE: 127 MMHG | TEMPERATURE: 97.7 F | HEIGHT: 63 IN | BODY MASS INDEX: 24.63 KG/M2 | OXYGEN SATURATION: 98 % | RESPIRATION RATE: 16 BRPM

## 2023-12-21 DIAGNOSIS — B00.1 RECURRENT COLD SORES: ICD-10-CM

## 2023-12-21 DIAGNOSIS — F32.A DEPRESSION, UNSPECIFIED DEPRESSION TYPE: Primary | ICD-10-CM

## 2023-12-21 PROCEDURE — 99214 OFFICE O/P EST MOD 30 MIN: CPT | Performed by: INTERNAL MEDICINE

## 2023-12-21 RX ORDER — VALACYCLOVIR HYDROCHLORIDE 500 MG/1
TABLET, FILM COATED ORAL
Qty: 90 TABLET | Refills: 1 | Status: SHIPPED | OUTPATIENT
Start: 2023-12-21

## 2023-12-21 RX ORDER — VENLAFAXINE 37.5 MG/1
TABLET ORAL
Qty: 90 TABLET | Refills: 3 | Status: SHIPPED | OUTPATIENT
Start: 2023-12-21

## 2023-12-21 ASSESSMENT — ENCOUNTER SYMPTOMS
RESPIRATORY NEGATIVE: 1
CARDIOVASCULAR NEGATIVE: 1
NERVOUS/ANXIOUS: 1
DYSPHORIC MOOD: 1
GASTROINTESTINAL NEGATIVE: 1
NEUROLOGICAL NEGATIVE: 1
CONSTITUTIONAL NEGATIVE: 1

## 2023-12-21 NOTE — PROGRESS NOTES
Assessment & Plan     Depression, unspecified depression type  At this time, patient's depression does appear to be under good control with her current dose of venlafaxine.  After much discussion, we did like to continue her venlafaxine at 37.5 mg by mouth per day.  Side effects of an SRI's were reviewed.  Patient is aware that should she have any issues with her medication or worsening of her mood that she can contact the clinic for additional assistance.  - venlafaxine (EFFEXOR) 37.5 MG tablet; TAKE 1 TABLET(37.5 MG) BY MOUTH AT BEDTIME    Recurrent cold sores  Patient has noted that she seems to be having more frequent cold sore flareups, and it seems to be related to dietary choices.  Patient is taking steps to try to modify her diet to prevent flares.  We will increase the quantity on her valacyclovir 500 mg tablets for continued use on an as-needed basis for cold sore flareups.  Given that her flareups only occur approximately once per month, I do think that it would be premature to initiate daily suppressive therapy.  Side effects of valacyclovir use were reviewed.  - valACYclovir (VALTREX) 500 MG tablet; TAKE 1 TABLET BY MOUTH AS DIRECTED AS NEEDED    Prescription drug management  30 minutes spent by me on the date of the encounter doing chart review, history and exam, documentation and further activities per the note       See Patient Instructions    Petros Chaudhry MD  [facility]    Dinah Fischer is a 63 year old, presenting for the following health issues:  Recheck Medication      Patient is a 63-year-old  female who presents to the clinic for her medication check.  Patient is requesting refills of venlafaxine and valacyclovir.  Patient has a longstanding history of depression, and she has been utilizing venlafaxine 37.5 mg daily for ongoing management.  Patient reports that she has been on this medication since approximately 2007.  She does feel that it helps  "keep her mood under good control.  Patient does report a number of increased risk in her life, and she does not feel that she will be able to function without her venlafaxine.  Patient does keep a supply of valacyclovir on hand for recurrent cold sores.  She has noticed over the past few months that dietary choices seem to be causing an increased frequency of flares.  Patient states that she will typically get 1 flareup per month, and it seems to be related to any ingestion of nuts or acidic foods.  Patient does respond well to the valacyclovir when she uses it.    History of Present Illness       Reason for visit:  Continue Prescriptions    She eats 2-3 servings of fruits and vegetables daily.She consumes 0 sweetened beverage(s) daily.She exercises with enough effort to increase her heart rate 9 or less minutes per day.  She exercises with enough effort to increase her heart rate 3 or less days per week.   She is taking medications regularly.       Review of Systems   Constitutional: Negative.    HENT: Negative.     Respiratory: Negative.     Cardiovascular: Negative.    Gastrointestinal: Negative.    Genitourinary: Negative.    Neurological: Negative.    Psychiatric/Behavioral:  Positive for dysphoric mood and mood changes. The patient is nervous/anxious.           Objective    Ht 1.6 m (5' 3\")   Breastfeeding No   BMI 23.91 kg/m    Body mass index is 23.91 kg/m .  Physical Exam  Vitals reviewed.   HENT:      Head: Normocephalic and atraumatic.      Mouth/Throat:      Mouth: Mucous membranes are moist.      Pharynx: Oropharynx is clear.   Eyes:      Extraocular Movements: Extraocular movements intact.      Conjunctiva/sclera: Conjunctivae normal.      Pupils: Pupils are equal, round, and reactive to light.   Cardiovascular:      Rate and Rhythm: Normal rate and regular rhythm.      Pulses: Normal pulses.      Heart sounds: Normal heart sounds.   Pulmonary:      Effort: Pulmonary effort is normal.      Breath " sounds: Normal breath sounds.   Skin:     General: Skin is warm and dry.      Capillary Refill: Capillary refill takes less than 2 seconds.   Neurological:      Mental Status: She is alert.   Psychiatric:         Attention and Perception: Attention and perception normal.         Mood and Affect: Mood and affect normal.         Speech: Speech normal.

## 2024-01-26 ENCOUNTER — NURSE TRIAGE (OUTPATIENT)
Dept: INTERNAL MEDICINE | Facility: CLINIC | Age: 64
End: 2024-01-26

## 2024-01-26 ENCOUNTER — OFFICE VISIT (OUTPATIENT)
Dept: FAMILY MEDICINE | Facility: CLINIC | Age: 64
End: 2024-01-26
Payer: COMMERCIAL

## 2024-01-26 VITALS
DIASTOLIC BLOOD PRESSURE: 83 MMHG | HEART RATE: 92 BPM | TEMPERATURE: 98.3 F | HEIGHT: 63 IN | WEIGHT: 136 LBS | BODY MASS INDEX: 24.1 KG/M2 | SYSTOLIC BLOOD PRESSURE: 152 MMHG | OXYGEN SATURATION: 96 % | RESPIRATION RATE: 20 BRPM

## 2024-01-26 DIAGNOSIS — J06.9 VIRAL UPPER RESPIRATORY TRACT INFECTION: Primary | ICD-10-CM

## 2024-01-26 PROCEDURE — 99213 OFFICE O/P EST LOW 20 MIN: CPT | Performed by: GENERAL PRACTICE

## 2024-01-26 ASSESSMENT — PAIN SCALES - GENERAL: PAINLEVEL: NO PAIN (0)

## 2024-01-26 NOTE — TELEPHONE ENCOUNTER
Nurse Triage SBAR    Is this a 2nd Level Triage? NO    Situation: Patient calls to request appointment.     Background: Patient has had cold symptoms since 1/21/2024    Assessment: Patient reports cold is settled in chest and she is coughing a lot (non productive). Patient reports when she tries to take a deep breath she has a coughing fit. Patient reports she vomited once, had nausea and having dizziness yesterday after taking the Mucinex. Patient is no longer dizzy today. Patient is also having: headache, chills, body aches. Patient has been able to drink fluids well, but not eating well. Patient denies chest pain, fever, wheezing or shortness of breath or lower edema swelling.    Patient took at home covid test from 2022 that was negative.     Tx; Kathleen flores, mucinex.     Protocol Recommended Disposition:   SEE IN OFFICE TODAY OR TOMORROW:     Recommendation: Recommend to be seen today or tomorrow per protocol. Appointment scheduled with [facility].    Appointments in Next Year      Jan 26, 2024  2:00 PM  (Arrive by 1:40 PM)  Provider Visit with Kathe Batista MD  [facility] ([facility] ) [phone]            Does the patient meet one of the following criteria for ADS visit consideration? 16+ years old, with an [facility] PCP     TIP  Providers, please consider if this condition is appropriate for management at one of our Acute and Diagnostic Services sites.     If patient is a good candidate, please use dotphrase <dot>triageresponse and select Refer to ADS to document.  Reason for Disposition   Continuous (nonstop) coughing interferes with work or school and no improvement using cough treatment per Care Advice    Additional Information   Negative: Bluish (or gray) lips or face   Negative: SEVERE difficulty breathing (e.g., struggling for each breath, speaks in single words)   Negative: Rapid onset of cough and has hives   Negative: Coughing started suddenly  after medicine, an allergic food or bee sting   Negative: Difficulty breathing after exposure to flames, smoke, or fumes   Negative: Sounds like a life-threatening emergency to the triager   Negative: Previous asthma attacks and this feels like asthma attack   Negative: Dry cough (non-productive; no sputum or minimal clear sputum) and within 14 days of COVID-19 Exposure   Negative: MODERATE difficulty breathing (e.g., speaks in phrases, SOB even at rest, pulse 100-120) and still present when not coughing   Negative: Chest pain present when not coughing   Negative: Passed out (i.e., fainted, collapsed and was not responding)   Negative: Patient sounds very sick or weak to the triager   Negative: SEVERE coughing spells (e.g., whooping sound after coughing, vomiting after coughing)   Negative: Coughing up josé manuel-colored (reddish-brown) or blood-tinged sputum   Negative: Fever present > 3 days (72 hours)   Negative: Fever returns after gone for over 24 hours and symptoms worse or not improved   Negative: Using nasal washes and pain medicine > 24 hours and sinus pain persists   Negative: Known COPD or other severe lung disease (i.e., bronchiectasis, cystic fibrosis, lung surgery) and worsening symptoms (i.e., increased sputum purulence or amount, increased breathing difficulty)   Negative: MILD difficulty breathing (e.g., minimal/no SOB at rest, SOB with walking, pulse <100) and still present when not coughing   Negative: Coughed up > 1 tablespoon (15 ml) blood (Exception: Blood-tinged sputum.)   Negative: Fever > 103 F (39.4 C)   Negative: Fever > 101 F (38.3 C) and over 60 years of age   Negative: Fever > 100.0 F (37.8 C) and has diabetes mellitus or a weak immune system (e.g., HIV positive, cancer chemotherapy, organ transplant, splenectomy, chronic steroids)   Negative: Fever > 100.0 F (37.8 C) and bedridden (e.g., CVA, chronic illness, recovering from surgery)   Negative: Increasing ankle swelling   Negative:  "Wheezing is present    Answer Assessment - Initial Assessment Questions  1. ONSET: \"When did the cough begin?\"       1/21/24  2. SEVERITY: \"How bad is the cough today?\"       Moderation  3. SPUTUM: \"Describe the color of your sputum\" (none, dry cough; clear, white, yellow, green)      No  4. HEMOPTYSIS: \"Are you coughing up any blood?\" If so ask: \"How much?\" (flecks, streaks, tablespoons, etc.)      No  5. DIFFICULTY BREATHING: \"Are you having difficulty breathing?\" If Yes, ask: \"How bad is it?\" (e.g., mild, moderate, severe)     - MILD: No SOB at rest, mild SOB with walking, speaks normally in sentences, can lie down, no retractions, pulse < 100.     - MODERATE: SOB at rest, SOB with minimal exertion and prefers to sit, cannot lie down flat, speaks in phrases, mild retractions, audible wheezing, pulse 100-120.     - SEVERE: Very SOB at rest, speaks in single words, struggling to breathe, sitting hunched forward, retractions, pulse > 120       None, but hard to take a deep breath due to coughing fit.   6. FEVER: \"Do you have a fever?\" If Yes, ask: \"What is your temperature, how was it measured, and when did it start?\"      No, patient didn't measure it.   7. CARDIAC HISTORY: \"Do you have any history of heart disease?\" (e.g., heart attack, congestive heart failure)       No  8. LUNG HISTORY: \"Do you have any history of lung disease?\"  (e.g., pulmonary embolus, asthma, emphysema)      No  9. PE RISK FACTORS: \"Do you have a history of blood clots?\" (or: recent major surgery, recent prolonged travel, bedridden)      No.   10. OTHER SYMPTOMS: \"Do you have any other symptoms?\" (e.g., runny nose, wheezing, chest pain)        Runny nose, headache, chills, body aches  11. PREGNANCY: \"Is there any chance you are pregnant?\" \"When was your last menstrual period?\"        N/A  12. TRAVEL: \"Have you traveled out of the country in the last month?\" (e.g., travel history, exposures)        No.    Protocols used: Cough-A-[place]    "

## 2024-01-26 NOTE — PROGRESS NOTES
"  Assessment & Plan     Viral upper respiratory tract infection  Feeling better today and did not want to cancel her appointment because she thought she can't cancel within 24 hours.   VSS  Salt rinse daily PRN   RTC if worsening                Subjective   Aaliyah is a 63 year old, presenting for the following health issues:  URI and Cough        1/26/2024     1:42 PM   Additional Questions   Roomed by Rena         1/26/2024     1:42 PM   Patient Reported Additional Medications   Patient reports taking the following new medications Mucinex     Cold symptoms since Sunday    +cough, congestion, headache  Feeling better today.  Used to cough with inhalation.   +chills yesterday, +myalgia  Takes alkaseltzer plus  Not much appetite since Sunday  Clear urine    Had cough drop and the tongue was white.      Had mucinex DM yesterday's afternoon from daughter's and started to sweats, nausea and initiated the office visit. Symptoms were resolved         History of Present Illness       Reason for visit:  Cold & flue symptoms not getting better  Symptom onset:  3-7 days ago  Symptoms include:  Chills, headache, body aches, cough, nausea, sweats    She eats 2-3 servings of fruits and vegetables daily.She consumes 0 sweetened beverage(s) daily.She exercises with enough effort to increase her heart rate 9 or less minutes per day.  She exercises with enough effort to increase her heart rate 3 or less days per week.   She is taking medications regularly.                 Review of Systems  Constitutional, HEENT, cardiovascular, pulmonary, gi and gu systems are negative, except as otherwise noted.      Objective    BP (!) 152/83 (BP Location: Right arm, Patient Position: Sitting, Cuff Size: Adult Regular)   Pulse 92   Temp 98.3  F (36.8  C) (Oral)   Resp 20   Ht 1.6 m (5' 3\")   Wt 61.7 kg (136 lb)   LMP  (LMP Unknown)   SpO2 96%   BMI 24.09 kg/m    Body mass index is 24.09 kg/m .  Physical Exam  Constitutional:       " Appearance: Normal appearance.   HENT:      Mouth/Throat:      Comments: Tongue: some white spots on the tongue, no concern for fungal today.   Eyes:      Extraocular Movements: Extraocular movements intact.   Cardiovascular:      Rate and Rhythm: Normal rate and regular rhythm.   Pulmonary:      Effort: Pulmonary effort is normal.      Breath sounds: Normal breath sounds.   Abdominal:      Palpations: Abdomen is soft.   Musculoskeletal:         General: Normal range of motion.      Cervical back: Normal range of motion.   Skin:     General: Skin is warm.   Neurological:      General: No focal deficit present.      Mental Status: She is alert and oriented to person, place, and time. Mental status is at baseline.   Psychiatric:         Mood and Affect: Mood normal.         Behavior: Behavior normal.         Thought Content: Thought content normal.         Judgment: Judgment normal.                    Signed Electronically by: Kathe Batista MD

## 2024-02-21 ENCOUNTER — NURSE TRIAGE (OUTPATIENT)
Dept: INTERNAL MEDICINE | Facility: CLINIC | Age: 64
End: 2024-02-21
Payer: COMMERCIAL

## 2024-02-21 NOTE — TELEPHONE ENCOUNTER
Nurse Triage SBAR    Is this a 2nd Level Triage? NO    Situation: Patient requesting virtual appointment with primary care provider.     Background: Patient reports history of UTI's and has had blood in urine with UTI's. History of total hysterectomy.     Assessment: Patient reports urinating bright red blood since 8 am. Patient says she's urinated 4-5 times today and each time she urinates she is having bright red blood. Patient reports dysuria as well. Patient reports feeling chills and having a discomfort/bloating in her abdomen. Patient hasn't checked her temperature (doesn't know where it is).  Patient feels nauseated and dizzy the last 20 minutes or so.     Patient denies any vaginal symptoms.     Tx: cranberry juice.     Protocol Recommended Disposition:   SEE IN OFFICE TODAY OR TOMORROW    Recommendation: Advised patient to go to urgent care due to symptoms and intensity of nausea. Patient wanted to look at video visits within the next few hours. Checked multiple clinics and offered patient video visit with [facility] at 5pm, but patient didn't want to wait that long. Advised patient again to try to go to an urgent care near her. Patient says she will plan to go to [facility] urgent care.    Does the patient meet one of the following criteria for ADS visit consideration? 16+ years old, with an [facility] PCP     TIP  Providers, please consider if this condition is appropriate for management at one of our Acute and Diagnostic Services sites.     If patient is a good candidate, please use dotphrase <dot>triageresponse and select Refer to ADS to document.  Reason for Disposition   Patient wants to be seen    Additional Information   Negative: Shock suspected (e.g., cold/pale/clammy skin, too weak to stand, low BP, rapid pulse)   Negative: Sounds like a life-threatening emergency to the triager   Negative: Followed a female genital area injury (e.g., labia, vagina, vulva)   Negative: Followed a male  "genital area injury (penis, scrotum)   Negative: Vaginal discharge   Negative: Pus (white, yellow) or bloody discharge from end of penis   Negative: Pain or burning with passing urine (urination) and pregnant   Negative: Pain or burning with passing urine (urination) and female   Negative: Pain or burning with passing urine (urination) and male   Negative: Pain or itching in the vulvar area   Negative: Pain in scrotum is main symptom   Negative: Blood in the urine is main symptom   Negative: Symptoms arising from use of a urinary catheter (e.g., Coude, [place])   Negative: Unable to urinate (or only a few drops) > 4 hours and bladder feels very full (e.g., palpable bladder or strong urge to urinate)   Negative: Side (flank) or lower back pain present   Negative: Bad or foul-smelling urine   Negative: Urinating more frequently than usual (i.e., frequency)   Negative: Can't control passage of urine (i.e., urinary incontinence) and new-onset (< 2 weeks) or worsening   Negative: Decreased urination and drinking very little and dehydration suspected (e.g., dark urine, no urine > 12 hours, very dry mouth, very lightheaded)   Negative: Patient sounds very sick or weak to the triager   Negative: Fever > 100.4 F  (38.0 C)    Answer Assessment - Initial Assessment Questions  1. SYMPTOM: \"What's the main symptom you're concerned about?\" (e.g., frequency, incontinence)      Blood in urine    2. ONSET: \"When did the  bleeding  start?\"      This morning at 8 am    3. PAIN: \"Is there any pain?\" If Yes, ask: \"How bad is it?\" (Scale: 1-10; mild, moderate, severe)      Yes, when she urinates    4. CAUSE: \"What do you think is causing the symptoms?\"      Possible UTI.    5. OTHER SYMPTOMS: \"Do you have any other symptoms?\" (e.g., blood in urine, fever, flank pain, pain with urination)      Blood in urine, chills, discomfort in abdomen    6. PREGNANCY: \"Is there any chance you are pregnant?\" \"When was your last menstrual period?\"      " N/A    Protocols used: Urinary Symptoms-A-OH

## 2024-05-13 ENCOUNTER — PATIENT OUTREACH (OUTPATIENT)
Dept: INTERNAL MEDICINE | Facility: CLINIC | Age: 64
End: 2024-05-13
Payer: COMMERCIAL

## 2024-05-13 NOTE — TELEPHONE ENCOUNTER
Patient Quality Outreach    Patient is due for the following:   Hypertension -  BP check  Colon Cancer Screening  Breast Cancer Screening - Mammogram  Physical Annual Wellness Visit      Topic Date Due    Zoster (Shingles) Vaccine (1 of 2) Never done    Diptheria Tetanus Pertussis (DTAP/TDAP/TD) Vaccine (3 - Td or Tdap) 02/02/2021    Pneumococcal Vaccine (1 of 1 - PCV) Never done    COVID-19 Vaccine (4 - 2023-24 season) 09/01/2023       Next Steps:   Schedule a Annual Wellness Visit    Type of outreach:    Sent [employer] message.      Questions for provider review:    None           Jody Helm MA

## 2024-09-22 ENCOUNTER — HEALTH MAINTENANCE LETTER (OUTPATIENT)
Age: 64
End: 2024-09-22

## 2024-11-11 ENCOUNTER — PATIENT OUTREACH (OUTPATIENT)
Dept: INTERNAL MEDICINE | Facility: CLINIC | Age: 64
End: 2024-11-11
Payer: COMMERCIAL

## 2024-11-11 NOTE — TELEPHONE ENCOUNTER
Patient Quality Outreach    Patient is due for the following:   Colon Cancer Screening  Breast Cancer Screening - Mammogram  Physical Annual Wellness Visit      Topic Date Due    Zoster (Shingles) Vaccine (1 of 2) Never done    Diptheria Tetanus Pertussis (DTAP/TDAP/TD) Vaccine (3 - Td or Tdap) 02/02/2021    Pneumococcal Vaccine (1 of 1 - PCV) Never done    Flu Vaccine (1) 09/01/2024    COVID-19 Vaccine (4 - 2024-25 season) 09/01/2024       Next Steps:   Schedule a Annual Wellness Visit    Type of outreach:    Sent [employer] message.      Questions for provider review:    None           Jody Helm MA  Chart routed to closed.

## 2025-01-15 ENCOUNTER — VIRTUAL VISIT (OUTPATIENT)
Dept: INTERNAL MEDICINE | Facility: CLINIC | Age: 65
End: 2025-01-15
Payer: COMMERCIAL

## 2025-01-15 DIAGNOSIS — M54.50 LUMBAR PAIN: Primary | ICD-10-CM

## 2025-01-15 PROCEDURE — 98013 SYNCH AUDIO-ONLY EST LOW 20: CPT | Performed by: INTERNAL MEDICINE

## 2025-01-15 ASSESSMENT — ENCOUNTER SYMPTOMS: BACK PAIN: 1

## 2025-01-15 NOTE — PROGRESS NOTES
Aaliyah is a 64 year old who is being evaluated via a billable telephone visit.    What phone number would you like to be contacted at? [phone]  How would you like to obtain your AVS? Vanessa  Originating Location (pt. Location): Other work     Distant Location (provider location):  On-site  Telephone visit completed due to the patient did not have access to video, while the distant provider did.    Assessment & Plan     Lumbar pain  Given the persistent nature of her pain, I did recommend that we consider imaging of her lumbar spine.  I did explain to the patient that since her previous imaging was performed approximately 12 years ago, I would recommend that she start with repeat spinal x-rays of the lumbar region.  Patient was in agreement.  X-rays have been ordered.  Patient will be contacted once the images are available for review.  - XR Lumbar Spine 2/3 Views; Future            See Patient Instructions    Subjective   Aaliyah is a 64 year old, presenting for the following health issues:  Back Pain    Patient is a 64-year-old female who participates in a virtual visit for discussion of back pain.  Patient does have a prior history of herniated disks in her lumbar spine that did require surgery approximately 10 to 12 years ago.  Patient last had imaging of her lumbar spine at an outside facility in 2012.  She is in the process of remodeling her home, and she reports that she has been very physically active.  Patient began to experience issues with pain in her lumbar region in October 2024.  Initially, her pain did radiate into her legs, but patient states her now her pain is strictly localized to her lumbar region.  She has had no change in her bowel or bladder habits.  Patient is utilizing Aleve for her discomfort.  She does report that her pain seems to be exacerbated with prolonged sitting or physical activity.    History of Present Illness       Back Pain:  She presents for follow up of back pain. Patient's  back pain is a recurring problem.  Location of back pain:  Right lower back, left lower back and left buttock  Description of back pain: burning, cramping, dull ache and shooting  Back pain spreads: left buttocks and left thigh    Since patient first noticed back pain, pain is: always present, but gets better and worse  Does back pain interfere with her job:  Yes       She eats 2-3 servings of fruits and vegetables daily.She consumes 0 sweetened beverage(s) daily.She exercises with enough effort to increase her heart rate 9 or less minutes per day.  She exercises with enough effort to increase her heart rate 3 or less days per week.   She is taking medications regularly.         Review of Systems  CONSTITUTIONAL: NEGATIVE for fever, chills, change in weight  INTEGUMENTARY/SKIN: NEGATIVE for worrisome rashes, moles or lesions  ENT/MOUTH: NEGATIVE for ear, mouth and throat problems  RESP: NEGATIVE for significant cough or SOB  CV: NEGATIVE for chest pain, palpitations or peripheral edema  GI: NEGATIVE for nausea, abdominal pain, heartburn, or change in bowel habits  : NEGATIVE for frequency, dysuria, or hematuria  MUSCULOSKELETAL: NEGATIVE for significant arthralgias or myalgia  NEURO: NEGATIVE for weakness, dizziness or paresthesias      Objective    Vitals - Patient Reported  Pain Score: Moderate Pain (5)  Pain Loc: Low Back      Vitals:  No vitals were obtained today due to virtual visit.    Physical Exam   General: Alert and no distress //Respiratory: No audible wheeze, cough, or shortness of breath // Psychiatric:  Appropriate affect, tone, and pace of words    Diagnostic testing: Lumbar x rays are pending.       Phone call duration: 21 minutes  Signed Electronically by: Petros Chaudhry MD

## 2025-01-31 ENCOUNTER — ANCILLARY PROCEDURE (OUTPATIENT)
Dept: GENERAL RADIOLOGY | Facility: CLINIC | Age: 65
End: 2025-01-31
Attending: INTERNAL MEDICINE
Payer: COMMERCIAL

## 2025-01-31 DIAGNOSIS — M54.50 LUMBAR PAIN: ICD-10-CM

## 2025-01-31 PROCEDURE — 72100 X-RAY EXAM L-S SPINE 2/3 VWS: CPT | Mod: TC | Performed by: RADIOLOGY

## 2025-02-07 ENCOUNTER — MYC MEDICAL ADVICE (OUTPATIENT)
Dept: INTERNAL MEDICINE | Facility: CLINIC | Age: 65
End: 2025-02-07
Payer: COMMERCIAL

## 2025-02-07 DIAGNOSIS — F32.A DEPRESSION, UNSPECIFIED DEPRESSION TYPE: ICD-10-CM

## 2025-02-07 DIAGNOSIS — M54.50 LUMBAR PAIN: Primary | ICD-10-CM

## 2025-02-07 NOTE — TELEPHONE ENCOUNTER
Patient agreeable to physical therapy referral provider recommended based on recent xray results.     Physical therapy referral pended.    Thank you,  Elio, Triage RN Malachi Baez    11:12 AM 2/7/2025

## 2025-02-09 RX ORDER — VENLAFAXINE 37.5 MG/1
TABLET ORAL
Qty: 90 TABLET | Refills: 0 | Status: SHIPPED | OUTPATIENT
Start: 2025-02-09

## 2025-02-12 ENCOUNTER — THERAPY VISIT (OUTPATIENT)
Dept: PHYSICAL THERAPY | Facility: CLINIC | Age: 65
End: 2025-02-12
Payer: COMMERCIAL

## 2025-02-12 DIAGNOSIS — M54.50 LUMBAR PAIN: ICD-10-CM

## 2025-02-12 PROCEDURE — 97530 THERAPEUTIC ACTIVITIES: CPT | Mod: GP | Performed by: PHYSICAL THERAPIST

## 2025-02-12 PROCEDURE — 97110 THERAPEUTIC EXERCISES: CPT | Mod: GP | Performed by: PHYSICAL THERAPIST

## 2025-02-12 PROCEDURE — 97161 PT EVAL LOW COMPLEX 20 MIN: CPT | Mod: GP | Performed by: PHYSICAL THERAPIST

## 2025-02-12 NOTE — PROGRESS NOTES
PHYSICAL THERAPY EVALUATION  Type of Visit: Evaluation      Subjective         Presenting condition or subjective complaint: Lower Back Pain  Date of onset: 22    Relevant medical history: Menopause   Dates & types of surgery: HNP (herniated nucleus pulposus), lumbar ...   10/22/2012    Prior diagnostic imaging/testing results: X-ray     Prior therapy history for the same diagnosis, illness or injury: No      Prior Level of Function  Transfers: Independent  Ambulation: Independent  ADL: Independent      Living Environment  Social support: With family members   Type of home: House; 2-story; Basement   Stairs to enter the home: No       Ramp: No   Stairs inside the home: Yes 16 Is there a railing: Yes     Help at home: Self Cares (home health aide/personal care attendant, family, etc)  Equipment owned:       Employment: Yes Facilities ;   Hobbies/Interests: Gardening, Painting, Sewing, Reading, Hiking, furniture refinishing    Patient goals for therapy: Not be in pain with normal activities and when sitting.    Pain assessment: Pain present  Location: central, right low back/Ratin/10    Present symptoms: patient reports pain located in the low back .    Radiation:R low back, intermittent into L leg   Type of pain is described as aching,    Associated symptoms include: yes foot tingling L (parasthesia)  Present since: 2024 with symptoms worsening (more easily provoked) (improving/unchanging/worsening).  This condition is new (new/recurrent/chronic).  Symptoms commenced as a result of: no apparent reason/possibly due to gardening   Condition occurred in the following environment: home   Symptoms at onset: central low back, left sided sciatica   Constant symptoms:   Intermittent symptoms:    Cough/Sneeze/Strain:neg    (with consideration for bending, sitting/rising, standing, walking, lying, am, as the day progresses, pm, when still, on the move, other )  Symptoms are  made worse with the following: sitting, pm, bending, squatting, prolonged standing  Symptoms are made better with the following: standing, walking    Sleep disturbance: yes  Sleeping postures: flat on back  Sleeping surface: firm (firm, soft, sag)       Objective   LUMBAR:    Posture:   Sitting: fair to poor; Standing:fair; Lordosis: reduced  Posture Correction: better  Relevant Lateral Shift: nil    Neurological:    Motor Deficit:  Myotomes L R   L1-2 (hip flexion)     L3 (knee extension)     L4 (ankle DF)     L5 (g. toe ext)     S1 (ankle PF or knee flex)       Sensory Deficit and Reflexes: n/a    Dural Signs:   L R   Slump - -   SLR     Other:     AROM: (Major, Moderate, Minimal or Nil loss)  Movement Loss Thien Mod Min Nil Pain   Flexion   x  pdm   Extension  x   erp   Side Gliding R  x      Side Gliding L  x        Repeated movement testing:   (During: produces, abolishes, increases, decreases, no effect, centralizing, peripheralizing; After: better, worse, no better, no worse, no effect, centralized, peripheralized)    Pre-test Symptoms Standing:    Symptoms During Symptoms After ROM increased ROM decreased No Effect   FIS        Rep FIS        EIS        Rep EIS        Pre-test Symptoms Lying: central LB    Symptoms During Symptoms After ROM increased ROM decreased No Effect   RODDY P L LB NW      Rep RODDY P L LB NW R SG,  L SG    EIL incr NW      Rep EIL incr NW Ext?, L SG,  NE onR SG     If required Pre-test Symptoms:    Symptoms During Symptoms After ROM increased ROM decreased No Effect   SGIS - R        Rep SGIS - R        SGIS - L        Rep SGIS - L          Static Tests: ALAYNA: D/B/incr EXT, B SG  Other Tests:     Provisional Classification: derangement, symmetrical above knee  Principle of Management (education/equipment/mechanical therapy/specific principle): sustained ALAYNA x 5', 4 x day; use of lumbar roll during sitting/posture correction.        Assessment & Plan   CLINICAL IMPRESSIONS  Medical  Diagnosis: lumbar pain    Treatment Diagnosis: decreased ROM, impaired function, decreased strength   Impression/Assessment: Patient is a 64 year old female with low back complaints.  The following significant findings have been identified: Pain, Decreased ROM/flexibility, Decreased strength, Decreased activity tolerance, and Impaired posture. These impairments interfere with their ability to perform self care tasks, work tasks, recreational activities, household chores, and driving  as compared to previous level of function.     Clinical Decision Making (Complexity):  Clinical Presentation: Evolving/Changing  Clinical Presentation Rationale: based on medical and personal factors listed in PT evaluation  Clinical Decision Making (Complexity): Low complexity    PLAN OF CARE  Treatment Interventions:  Interventions: Manual Therapy, Neuromuscular Re-education, Therapeutic Activity, Therapeutic Exercise    Long Term Goals     PT Goal 1  Goal Identifier: sitting  Goal Description: patient will sit in neutral posture (or with lumbar roll as needed) for 2+ hours painfree  Rationale: to maximize safety and independence within the community;to maximize safety and independence with transportation;to maximize safety and independence with self cares  Target Date: 04/09/25      Frequency of Treatment: 1 x week  Duration of Treatment: 8 weeks    Recommended Referrals to Other Professionals:   Education Assessment:   Learner/Method: Patient;No Barriers to Learning;Pictures/Video    Risks and benefits of evaluation/treatment have been explained.   Patient/Family/caregiver agrees with Plan of Care.     Evaluation Time:     PT Eval, Low Complexity Minutes (85901): 25       Signing Clinician: Vandana Jean Baptiste PT

## 2025-02-15 ENCOUNTER — HEALTH MAINTENANCE LETTER (OUTPATIENT)
Age: 65
End: 2025-02-15

## 2025-02-24 ENCOUNTER — THERAPY VISIT (OUTPATIENT)
Dept: PHYSICAL THERAPY | Facility: CLINIC | Age: 65
End: 2025-02-24
Payer: COMMERCIAL

## 2025-02-24 DIAGNOSIS — M54.50 LUMBAR PAIN: Primary | ICD-10-CM

## 2025-02-24 PROCEDURE — 97110 THERAPEUTIC EXERCISES: CPT | Mod: GP | Performed by: PHYSICAL THERAPIST

## 2025-03-04 ENCOUNTER — THERAPY VISIT (OUTPATIENT)
Dept: PHYSICAL THERAPY | Facility: CLINIC | Age: 65
End: 2025-03-04
Payer: COMMERCIAL

## 2025-03-04 DIAGNOSIS — M54.50 LUMBAR PAIN: Primary | ICD-10-CM

## 2025-03-04 PROCEDURE — 97110 THERAPEUTIC EXERCISES: CPT | Mod: GP | Performed by: PHYSICAL THERAPIST

## 2025-03-25 ENCOUNTER — THERAPY VISIT (OUTPATIENT)
Dept: PHYSICAL THERAPY | Facility: CLINIC | Age: 65
End: 2025-03-25
Payer: COMMERCIAL

## 2025-03-25 DIAGNOSIS — M54.50 LUMBAR PAIN: Primary | ICD-10-CM

## 2025-03-25 PROCEDURE — 97110 THERAPEUTIC EXERCISES: CPT | Mod: GP | Performed by: PHYSICAL THERAPIST

## 2025-08-25 DIAGNOSIS — F32.A DEPRESSION, UNSPECIFIED DEPRESSION TYPE: ICD-10-CM

## 2025-08-27 RX ORDER — VENLAFAXINE 37.5 MG/1
TABLET ORAL
Qty: 90 TABLET | Refills: 0 | OUTPATIENT
Start: 2025-08-27

## 2025-09-04 DIAGNOSIS — F32.A DEPRESSION, UNSPECIFIED DEPRESSION TYPE: ICD-10-CM

## 2025-09-04 RX ORDER — VENLAFAXINE 37.5 MG/1
TABLET ORAL
Qty: 90 TABLET | Refills: 0 | Status: SHIPPED | OUTPATIENT
Start: 2025-09-04